# Patient Record
Sex: MALE | Employment: FULL TIME | URBAN - METROPOLITAN AREA
[De-identification: names, ages, dates, MRNs, and addresses within clinical notes are randomized per-mention and may not be internally consistent; named-entity substitution may affect disease eponyms.]

---

## 2020-12-03 ENCOUNTER — OFFICE VISIT (OUTPATIENT)
Dept: INTERNAL MEDICINE | Facility: CLINIC | Age: 45
End: 2020-12-03
Payer: COMMERCIAL

## 2020-12-03 VITALS
SYSTOLIC BLOOD PRESSURE: 125 MMHG | HEART RATE: 85 BPM | DIASTOLIC BLOOD PRESSURE: 87 MMHG | OXYGEN SATURATION: 95 % | WEIGHT: 218 LBS | BODY MASS INDEX: 28.76 KG/M2

## 2020-12-03 DIAGNOSIS — Z00.00 HEALTH CARE MAINTENANCE: ICD-10-CM

## 2020-12-03 DIAGNOSIS — N52.9 ERECTILE DYSFUNCTION, UNSPECIFIED ERECTILE DYSFUNCTION TYPE: Primary | ICD-10-CM

## 2020-12-03 DIAGNOSIS — B35.1 ONYCHOMYCOSIS: ICD-10-CM

## 2020-12-03 PROCEDURE — 90471 IMMUNIZATION ADMIN: CPT | Performed by: STUDENT IN AN ORGANIZED HEALTH CARE EDUCATION/TRAINING PROGRAM

## 2020-12-03 PROCEDURE — 99386 PREV VISIT NEW AGE 40-64: CPT | Mod: 25 | Performed by: STUDENT IN AN ORGANIZED HEALTH CARE EDUCATION/TRAINING PROGRAM

## 2020-12-03 PROCEDURE — 90686 IIV4 VACC NO PRSV 0.5 ML IM: CPT | Performed by: STUDENT IN AN ORGANIZED HEALTH CARE EDUCATION/TRAINING PROGRAM

## 2020-12-03 RX ORDER — KETOCONAZOLE 20 MG/G
CREAM TOPICAL DAILY
Qty: 1 G | Refills: 0 | Status: SHIPPED | OUTPATIENT
Start: 2020-12-03 | End: 2021-03-03

## 2020-12-03 RX ORDER — SILDENAFIL 25 MG/1
12.5 TABLET, FILM COATED ORAL DAILY PRN
Qty: 30 TABLET | Refills: 0 | Status: SHIPPED | OUTPATIENT
Start: 2020-12-03 | End: 2021-06-14

## 2020-12-03 ASSESSMENT — PAIN SCALES - GENERAL: PAINLEVEL: NO PAIN (0)

## 2020-12-03 NOTE — NURSING NOTE
Chief Complaint   Patient presents with     Physical     Pt comes in for annual physical      SANTIAGO Trevino at 11:58 AM sign on 12/3/2020

## 2020-12-03 NOTE — NURSING NOTE
Patient received Flu vaccine. Vaccine was given under the supervision of Dr. Macario. See immunization list for administration details. Pt was advised to remain in [facility] lobby for 15 minutes in case of an averse reaction. Given by Pricila Solis CMA, EMT 1:03 PM 12/3/2020

## 2020-12-03 NOTE — PROGRESS NOTES
"CC: Annual visit     HPI: Jae is a 45Y with a PMH of hemorrhoids presented for his annual visit.    He complains of occasional ED. He does confirm that he does have erections but he is unable to consistently sustain it during intercourse. He attributes it to stress and derangements to his work life balance related to COVID. Negative for signs of depression. Denies fatigue, changes to his sleep or appetite. Tries to lead a healthy lifestyle. Physically active. Does not smoke. He has a family history of early heart disease on his mothers side of the family, notable for \" maker\" infarcts  <50Y.     Exam was notable for onychomycosis on the large toe b/l. Patient remains asymptomatic, it does not bother him. He would like to try local agents for now and will consider getting it pulling in the future.  ROS negative as bellow.    ROS: Review Of Systems  Skin: negative  Eyes: negative  Ears/Nose/Throat: negative  Respiratory: No shortness of breath, dyspnea on exertion, cough, or hemoptysis  Cardiovascular: negative  Gastrointestinal: negative  Genitourinary: negative  Musculoskeletal: negative  Neurologic: negative  Psychiatric: negative  Hematologic/Lymphatic/Immunologic: negative  Endocrine: negative      Past Medical History:   Diagnosis Date     No active medical problems      No past surgical history on file.  Family History   Problem Relation Age of Onset     Family History Negative Mother      Family History Negative Father      Social History     Tobacco Use     Smoking status: Never Smoker     Smokeless tobacco: Never Used   Substance Use Topics     Alcohol use: Yes     Comment: infrequently     Drug use: No     Current Outpatient Medications   Medication Sig Dispense Refill     ketoconazole (NIZORAL) 2 % external cream Apply topically daily for 90 doses 1 g 0     sildenafil (VIAGRA) 25 MG tablet Take 0.5 tablets (12.5 mg) by mouth daily as needed 30 tablet 0      No Known Allergies      /87 (BP " Location: Right arm, Patient Position: Sitting, Cuff Size: Adult Regular)   Pulse 85   Wt 98.9 kg (218 lb)   SpO2 95%   BMI 28.76 kg/m    Physical Examination:    General:  Conversant, generally healthy appearing, no acute distress  Neck:  Trachea midline, Full AROM, supple, thyroid smooth, symmetric, not enlarged, no nodules, no neck lymphadenopathy  Lungs:  Clear to auscultation throughout, no wheezes, rhonchi or rales. Normal respiratory effort and no intercostal retractions.  C/V:  Regular rate and rhythm, no murmurs, rubs or gallops.  No JVD, no carotid bruits. Radial and DP pulses 2+, equal and regular.  Abdomen:  Not distended.  Bowel sounds active.  No tenderness, no hepatosplenomegaly or masses.  No CVA tenderness or masses.  Extremities:  No peripheral edema, no digital cyanosis. Onychomycosis on large toe b/l.   Psych:  Alert and oriented. Appropriate affect.  Not psychomotor slowed.  No signs of anxiety or agitation.      A&P:    Erectile dysfunction  -     sildenafil (VIAGRA) 25 MG tablet; Take 0.5 tablets (12.5 mg) by mouth daily as needed    Health care maintenance  -     Lipid Profile FASTING; Future  -     Hemoglobin A1c; Future  -     Follow up in one year    Onychomycosis  -     ketoconazole (NIZORAL) 2 % external cream; Apply topically daily for 90 doses    Other orders  -     FLU VAC PRESRV FREE QUAD SPLIT VIR 3+YRS IM      Asif Singleton MD  Internal Medicine Resident (PGY1)  [id number]      This patient was discussed and seen with Dr. Macario    Pt was seen and examined with Dr. Singleton.  I agree with his documentation as noted above.    My additional comments: Also counseled on psychological aspects of ED    Charles Macario MD, MD

## 2020-12-03 NOTE — PATIENT INSTRUCTIONS
[facility] Medication Refill Request Information:  * Please contact your pharmacy regarding ANY request for medication refills.  ** [facility] Prescription Fax = [phone]  * Please allow 3 business days for routine medication refills.  * Please allow 5 business days for controlled substance medication refills.     [facility] Test Result notification information:  *You will be notified with in 7-10 days of your appointment day regarding the results of your test.  If you are on MyChart you will be notified as soon as the provider has reviewed the results and signed off on them.    [facility]: [phone]

## 2021-03-15 ENCOUNTER — VIRTUAL VISIT (OUTPATIENT)
Dept: INTERNAL MEDICINE | Facility: CLINIC | Age: 46
End: 2021-03-15
Payer: COMMERCIAL

## 2021-03-15 ENCOUNTER — TELEPHONE (OUTPATIENT)
Dept: INTERNAL MEDICINE | Facility: CLINIC | Age: 46
End: 2021-03-15

## 2021-03-15 DIAGNOSIS — L64.9 ANDROGENIC ALOPECIA, UNSPECIFIED: Primary | ICD-10-CM

## 2021-03-15 PROCEDURE — 99215 OFFICE O/P EST HI 40 MIN: CPT | Mod: GT | Performed by: PEDIATRICS

## 2021-03-15 RX ORDER — FINASTERIDE 1 MG/1
1 TABLET, FILM COATED ORAL DAILY
Qty: 60 TABLET | Refills: 6 | Status: SHIPPED | OUTPATIENT
Start: 2021-03-15 | End: 2022-02-22

## 2021-03-15 NOTE — NURSING NOTE
Chief Complaint   Patient presents with     Recheck Medication     hair loss     Kimberly Nissen, EMT at 10:30 AM on 3/15/2021    Video Visit Technology for this patient: Ky not working, patient has smart device, please try Doximity Video with patient

## 2021-03-15 NOTE — PROGRESS NOTES
Dear patient. I use the medical record to document (to the best of my ability) my understanding of:   - What you told me,  - Relevant details from my exam, records review, and/or test results, and  - My assessment and plan  If you have questions, you are welcome to contact me; I will reply as soon as time allows.      Virtual Visit Details    Type of service:  Video Visit    Start Time: 10:45 AM    End Time:11:34 PM    Originating Location (pt. Location): Home    Distant Location (provider location):  [facility] [place]     Platform used for Visit: Ky    PCP: Asif Singleton  Visit type: problem-oriented  Time note ((n4, 45'): The total time (on the date of service) for this service was 50 minutes, including discussion/face-to-face, chart review, interpretation not otherwise reported, documentation, and updating of the computerized record.        Context    Susannah Rowe is a 46 year old man, with concerns including:  Chief Complaint   Patient presents with     Recheck Medication     hair loss       History, update, and/or problems      Male pattern baldness  SUBJECTIVE: He wanted to discuss thinning hair, hair loss. He sees a lot of ads on TV, wants to hear about his options. No rash or pain.  OBJECTIVE: Hair loss visible over screen appears to be male pattern with receeding anterior hairline.  ASSESSMENT and PLAN: We had a long conversation about pros and cons, including the sexual side effects seen with finasteride, and variation in outcomes with finasteride, minoxidil, and/or various surgery or other approaches.    He elected to proceed with finasteride.    I asked him to get a blood pressure or two (at drug store, for example) and send the numbers to me.          General comments        Review of Systems      Physical exam as possible  Physical Exam  Constitutional:       Appearance: Normal appearance.   HENT:      Head: Normocephalic.      Right Ear: External ear normal.       Left Ear: External ear normal.      Nose: Nose normal.   Eyes:      General: No scleral icterus.        Right eye: No discharge.         Left eye: No discharge.      Extraocular Movements: Extraocular movements intact.   Pulmonary:      Effort: Pulmonary effort is normal. No respiratory distress.      Breath sounds: No stridor. No wheezing.   Skin:     Findings: No rash.      Comments: No apparent abnormalities in exposed skin.  Hair see problem note   Neurological:      Mental Status: He is alert.   Psychiatric:         Attention and Perception: Attention normal.         Mood and Affect: Mood normal.         Speech: Speech normal.         Behavior: Behavior normal.         Thought Content: Thought content normal.         Judgment: Judgment normal.

## 2021-03-15 NOTE — ASSESSMENT & PLAN NOTE
Male pattern baldness  SUBJECTIVE: He wanted to discuss thinning hair, hair loss. He sees a lot of ads on TV, wants to hear about his options. No rash or pain.  OBJECTIVE: Hair loss visible over screen appears to be male pattern with receeding anterior hairline.  ASSESSMENT and PLAN: We had a long conversation about pros and cons, including the sexual side effects seen with finasteride, and variation in outcomes with finasteride, minoxidil, and/or various surgery or other approaches.    He elected to proceed with finasteride.    I asked him to get a blood pressure or two (at drug store, for example) and send the numbers to me.

## 2021-03-16 NOTE — TELEPHONE ENCOUNTER
PRIOR AUTHORIZATION DENIED    Medication: finasteride (PROPECIA) 1 MG tablet    Denial Date: 3/16/2021    Denial Rational:         Appeal Information:

## 2021-03-16 NOTE — TELEPHONE ENCOUNTER
Central Prior Authorization Team   Phone: [phone]      PA Initiation    Medication: finasteride (PROPECIA) 1 MG tablet  Insurance Company: [employer] ([facility]) - Phone [phone] Fax [phone]  Pharmacy Filling the Rx: [id number] [place] [facility] [place] [address] [place] [facility] [address] [facility] [address]  Filling Pharmacy Phone: [phone]  Filling Pharmacy Fax:    Start Date: 3/16/2021

## 2021-06-14 DIAGNOSIS — N52.9 ERECTILE DYSFUNCTION, UNSPECIFIED ERECTILE DYSFUNCTION TYPE: ICD-10-CM

## 2021-06-14 RX ORDER — SILDENAFIL 25 MG/1
12.5 TABLET, FILM COATED ORAL DAILY PRN
Qty: 30 TABLET | Refills: 0 | Status: SHIPPED | OUTPATIENT
Start: 2021-06-14 | End: 2021-12-27

## 2021-06-14 NOTE — CONFIDENTIAL NOTE
3/15/2021  [facility] [place]   River Merlos MD  Internal Medicine    sildenafil (VIAGRA) 25 MG tablet

## 2021-12-27 DIAGNOSIS — N52.9 ERECTILE DYSFUNCTION, UNSPECIFIED ERECTILE DYSFUNCTION TYPE: ICD-10-CM

## 2021-12-27 RX ORDER — SILDENAFIL 25 MG/1
12.5 TABLET, FILM COATED ORAL DAILY PRN
Qty: 30 TABLET | Refills: 0 | Status: SHIPPED | OUTPATIENT
Start: 2021-12-27 | End: 2022-03-24

## 2021-12-27 NOTE — TELEPHONE ENCOUNTER
Last Clinic Visit: 3/15/2021  [facility] [place]  Call to Susannah, home and mobile number incorrect.  Call to work number, advised prescription as been sent to pharmacy

## 2021-12-27 NOTE — TELEPHONE ENCOUNTER
Health Call Center    Phone Message    May a detailed message be left on voicemail: yes     Reason for Call: Medication Refill Request    Has the patient contacted the pharmacy for the refill? Yes   Name of medication being requested: Sildenafil  Provider who prescribed the medication: Dr. Singleton  Pharmacy: [id number] [place] [address] [place] [facility] [address]  Date medication is needed: as soon as possible, per patient,          Action Taken: Message routed to:  [facility] med refill     Travel Screening: Not Applicable

## 2022-01-06 ENCOUNTER — TELEPHONE (OUTPATIENT)
Dept: INTERNAL MEDICINE | Facility: CLINIC | Age: 47
End: 2022-01-06

## 2022-01-06 NOTE — TELEPHONE ENCOUNTER
[facility] Call Center    Phone Message    May a detailed message be left on voicemail: yes     Reason for Call: Other: Patient requesting colon cancer screening. Patient is wondering if that can be done at his annual physical on 02/22/2022.     Please call back to discuss.    Action Taken: Message routed to:  [facility]): PCC    Travel Screening: Not Applicable

## 2022-02-22 ENCOUNTER — VIRTUAL VISIT (OUTPATIENT)
Dept: INTERNAL MEDICINE | Facility: CLINIC | Age: 47
End: 2022-02-22
Payer: COMMERCIAL

## 2022-02-22 DIAGNOSIS — Z13.1 DIABETES MELLITUS SCREENING: Primary | ICD-10-CM

## 2022-02-22 DIAGNOSIS — L64.9 ANDROGENIC ALOPECIA, UNSPECIFIED: ICD-10-CM

## 2022-02-22 DIAGNOSIS — Z13.220 LIPID SCREENING: ICD-10-CM

## 2022-02-22 DIAGNOSIS — Z12.11 COLON CANCER SCREENING: ICD-10-CM

## 2022-02-22 PROCEDURE — 99214 OFFICE O/P EST MOD 30 MIN: CPT | Mod: 95 | Performed by: PEDIATRICS

## 2022-02-22 NOTE — NURSING NOTE
Susannah Rowe is a 47 year old male patient who is being evaluated via a billable video visit. The patient was called and checked in for today's virtual visit. After the patient was checked in, Susannah Rowe's primary concerns for today's visit were discussed. The following are the primary complaints of the patient:     Chief Complaint   Patient presents with     Establish Care     Several health concerns     The patient's allergies and medications were reviewed as noted. The patient does not have any other questions for the provider.    Marcos Leos, EMT at 5:50 PM on 2/22/2022  Primary Care Center  [facility]

## 2022-02-23 NOTE — ASSESSMENT & PLAN NOTE
Hair loss problem. He was prescribed finasteride, so didn't feel it improved anything. He asked for recommendations about hair transplant. We talked about this last year, and how this is usually cosmetic and not covered by insurance. I don't have specific recommendations about a place to go in the area.

## 2022-02-23 NOTE — PROGRESS NOTES
"Dear patient. Thank you for visiting with me. I want you to feel respected, understood, and empowered. \"Respect\" is valuing you as much as I would a close family member. \"Empowerment\" happens when you are fully informed, and can make the best possible decision for you.  Please ask me questions!  Challenge anything that is not clear.    Medical records are primarily used as memory aids for me and my colleagues. Things to know about my documentation style:  - The 'problem list' includes current symptoms or diagnoses, and some problems that are resolved but may return. I use the past medical history for problems not expected to return.  - I use single quotation marks for things that you or I said, when I want to clarify who was speaking.  - I use double quotation marks when copying a term from elsewhere in your records. Italics (besides here) may also denote a quotation.  If you have questions or concerns, please contact me; I will reply as soon as time allows.      Virtual Visit Details    Type of service:  Video Visit, tech problems so switching to audio    Start Time: 7:02 PM    End Time:7:33 PM    Originating Location (pt. Location): Home    Distant Location (provider location):  [facility] [place]     Platform used for Visit: manetch    PCP: River Merlos  Visit type: problem-oriented  Time note (e4, 30'): The total time (on the date of service) for this service was 37 minutes, including discussion/face-to-face, chart review, interpretation not otherwise reported, documentation, and updating of the computerized record.        Context    Susannah Rowe is a 47 year old man, with concerns including:  Chief Complaint   Patient presents with     Establish Care     Several health concerns       History, update, and/or problems    Wants to get physical things out of the way, due to age. Lots of questions addressed.    He has a gym membership, works out once in a while (around " 1/month lately with illness, but usually 3-4 times/week, can shovel snow ok). He travels for work.    Other questions about risk, risk assessment for heart. Will     COVID shot recommended.  Due for tetanus shot (counseled about this)    Colon cancer screening  No FHx. He talked about maybe staying awake.  Details about prep    Counseled and declined HIV and HCV    1. Diabetes mellitus screening  - Glucose; Future    2. Lipid screening  - Lipid Profile FASTING; Future    3. Colon cancer screening  - Adult Gastro Ref - Procedure Only; Future      Hair loss problem. He was prescribed finasteride, so didn't feel it improved anything. He asked for recommendations about hair transplant. We talked about this last year, and how this is usually cosmetic and not covered by insurance. I don't have specific recommendations about a place to go in the area.

## 2022-03-23 ENCOUNTER — LAB (OUTPATIENT)
Dept: LAB | Facility: CLINIC | Age: 47
End: 2022-03-23
Payer: COMMERCIAL

## 2022-03-23 DIAGNOSIS — N52.9 ERECTILE DYSFUNCTION, UNSPECIFIED ERECTILE DYSFUNCTION TYPE: ICD-10-CM

## 2022-03-23 DIAGNOSIS — Z13.1 DIABETES MELLITUS SCREENING: ICD-10-CM

## 2022-03-23 DIAGNOSIS — Z13.220 LIPID SCREENING: ICD-10-CM

## 2022-03-23 LAB
CHOLEST SERPL-MCNC: 170 MG/DL
FASTING STATUS PATIENT QL REPORTED: YES
FASTING STATUS PATIENT QL REPORTED: YES
GLUCOSE BLD-MCNC: 100 MG/DL (ref 70–99)
HDLC SERPL-MCNC: 48 MG/DL
LDLC SERPL CALC-MCNC: 94 MG/DL
NONHDLC SERPL-MCNC: 122 MG/DL
TRIGL SERPL-MCNC: 142 MG/DL

## 2022-03-23 PROCEDURE — 80061 LIPID PANEL: CPT

## 2022-03-23 PROCEDURE — 82947 ASSAY GLUCOSE BLOOD QUANT: CPT

## 2022-03-23 PROCEDURE — 36415 COLL VENOUS BLD VENIPUNCTURE: CPT

## 2022-03-24 ENCOUNTER — MYC MEDICAL ADVICE (OUTPATIENT)
Dept: INTERNAL MEDICINE | Facility: CLINIC | Age: 47
End: 2022-03-24

## 2022-03-24 DIAGNOSIS — Z53.9 ERRONEOUS ENCOUNTER--DISREGARD: Primary | ICD-10-CM

## 2022-03-24 NOTE — CONFIDENTIAL NOTE
Please call patient. Wanted to send the following messages, but the patient doesn't have Fin[employer]yticahart.    MESSAGE #1. Sildenafil is no longer covered by his insurance.     I received your request for a refill of sildenafil, which is fine with me. However, the computer system tells me that your insurance prefers an alternative, tadafil. Therefore, sildenafil will cost you more than tadalfil. The two medicines have basically the same effect.  Please check with your insurance, and let us know what you want to do.      MESSAGE #2, since you are calling.    Blood tests were normal. I recommend another fasting test in 1 year.

## 2022-03-24 NOTE — TELEPHONE ENCOUNTER
[facility] Call Center    Phone Message    May a detailed message be left on voicemail: yes     Reason for Call: Other: Patient called regarding his sildenafil prescription refill request. Patient stated its fine sending through the sildenafil since he will be using a different insurance company. Please send the refill to [facility] in [place].      Action Taken: Message routed to:  [facility]    Travel Screening: Not Applicable

## 2022-03-25 RX ORDER — SILDENAFIL 25 MG/1
12.5 TABLET, FILM COATED ORAL DAILY PRN
Qty: 30 TABLET | Refills: 6 | Status: SHIPPED | OUTPATIENT
Start: 2022-03-25 | End: 2022-09-26

## 2022-03-25 NOTE — TELEPHONE ENCOUNTER
sildenafil (VIAGRA) 25 MG tablet  Last Written Prescription Date:  12/27/2021  Last Fill Quantity: 30,   # refills: 0  Last Office Visit : 2/22/2022  Future Office visit:  None  30 Tabs, 6 Refills sent to pharm 3/25/2022      Eleanor Valle RN  Central Triage Red Flags/Med Refills     /

## 2022-06-19 PROBLEM — R73.01 IMPAIRED FASTING GLUCOSE: Status: ACTIVE | Noted: 2022-06-19

## 2022-06-19 PROBLEM — R73.01 IMPAIRED FASTING GLUCOSE: Status: ACTIVE | Noted: 2022-04-01

## 2022-09-26 ENCOUNTER — TELEPHONE (OUTPATIENT)
Dept: INTERNAL MEDICINE | Facility: CLINIC | Age: 47
End: 2022-09-26

## 2022-09-26 DIAGNOSIS — N52.9 ERECTILE DYSFUNCTION, UNSPECIFIED ERECTILE DYSFUNCTION TYPE: ICD-10-CM

## 2022-09-26 NOTE — TELEPHONE ENCOUNTER
[facility] Call Center    Phone Message    May a detailed message be left on voicemail: yes     Reason for Call: Medication Question or concern regarding medication   Prescription Clarification  Name of Medication:    sildenafil (VIAGRA) 25 MG tablet     Prescribing Provider: Dr. Merlos      What on the order needs clarification? Patient calling requesting to change the above medication dosage to 25MG daily. Patient states its a 12.5 MG dose. Please call to discuss thank you.           Action Taken: Message routed to:  [facility]    Travel Screening: Not Applicable

## 2022-09-27 RX ORDER — SILDENAFIL 25 MG/1
25 TABLET, FILM COATED ORAL DAILY PRN
Qty: 30 TABLET | Refills: 3 | Status: SHIPPED | OUTPATIENT
Start: 2022-09-27 | End: 2023-03-16

## 2022-12-02 ENCOUNTER — OFFICE VISIT (OUTPATIENT)
Dept: INTERNAL MEDICINE | Facility: CLINIC | Age: 47
End: 2022-12-02
Payer: COMMERCIAL

## 2022-12-02 VITALS
SYSTOLIC BLOOD PRESSURE: 116 MMHG | BODY MASS INDEX: 30.71 KG/M2 | WEIGHT: 231.7 LBS | HEIGHT: 73 IN | DIASTOLIC BLOOD PRESSURE: 78 MMHG | OXYGEN SATURATION: 96 % | HEART RATE: 63 BPM

## 2022-12-02 DIAGNOSIS — M54.50 CHRONIC RIGHT-SIDED LOW BACK PAIN WITHOUT SCIATICA: Primary | ICD-10-CM

## 2022-12-02 DIAGNOSIS — G89.29 CHRONIC RIGHT-SIDED LOW BACK PAIN WITHOUT SCIATICA: Primary | ICD-10-CM

## 2022-12-02 PROCEDURE — 99214 OFFICE O/P EST MOD 30 MIN: CPT | Performed by: INTERNAL MEDICINE

## 2022-12-02 RX ORDER — MELOXICAM 7.5 MG/1
15 TABLET ORAL DAILY
Qty: 60 TABLET | Refills: 1 | Status: SHIPPED | OUTPATIENT
Start: 2022-12-02 | End: 2024-01-17

## 2022-12-02 NOTE — NURSING NOTE
"Susannah Rowe is a 47 year old male patient that presents today in clinic for the following:    Chief Complaint   Patient presents with     Pain     Pt reports lower right back and leg pain (where butt, back and leg meet), usually when sitting, sometimes lying down, sharp burning pain     The patient's allergies and medications were reviewed as noted. A set of vitals were recorded as noted without incident: /78 (BP Location: Right arm, Patient Position: Sitting, Cuff Size: Adult Large)   Pulse 63   Ht 1.854 m (6' 0.99\")   Wt 105.1 kg (231 lb 11.2 oz)   SpO2 96%   BMI 30.58 kg/m  . The patient does not have any other questions for the provider.    Michael Hadley, Visit Facilitator 7:13 AM on 12/2/2022   "

## 2022-12-02 NOTE — PROGRESS NOTES
Assessment & Plan   Chronic right-sided low back pain without sciatica  No red flag symptoms.  SI joint pain versus other imbalance.  Will do course of antiinflammatories and referral to PT.  If no improvement, consider imaging at that point  - meloxicam (MOBIC) 7.5 MG tablet  Dispense: 60 tablet; Refill: 1  - Physical Therapy Referral    HM: declined flu shot    RTC: Return if symptoms worsen or fail to improve.  I spent a total of 30 minutes on the day of the visit.  Time was spent doing chart review, history and exam, documentation and further activities as noted above.    Cherelle Mac MD  Securely message with the Vocera Web Console      Chief Complaint   Susannah Rowe is a 47 year old male presents for the following health issues    History of Present Illness   Pain in R low back.    When driving.  When he brakes with the R leg he feels increased pain in low back.    Also notes it when laying in bed at night  Has a desk job.  Occasional pain when sitting    Goes to gym a few times per week  No pain when active and moving around  Will try to stretch at the gym and almost feels like it helps    Pain stays in low back, no radiation  Feels like a burning  No weakness    Has been present for 2-3 months.  Has taken ibuprofen.  Helped a little  Because it's intermittent, it's hard to know if ibuprofen is helping  Typically gets pain at least daily  Has used massage gun.  Seems like pain is too deep to really improve symptoms      Tobacco Use      Smoking status: Never      Smokeless tobacco: Never    PHQ-2 Score:   PHQ-2 ( 1999 Pfizer) 12/2/2022 12/3/2020   Q1: Little interest or pleasure in doing things 0 0   Q2: Feeling down, depressed or hopeless 0 0   PHQ-2 Score 0 0   PHQ-2 Total Score (12-17 Years)- Positive if 3 or more points; Administer PHQ-A if positive - 0       ROS    Physical Exam   /78 (BP Location: Right arm, Patient Position: Sitting, Cuff Size: Adult Large)   Pulse 63   Ht 1.854 m  "(6' 0.99\")   Wt 105.1 kg (231 lb 11.2 oz)   SpO2 96%   BMI 30.58 kg/m    Gen: no distress, comfortable, pleasant   Eyes: anicteric, normal extra-ocular movements   Unable to reproduce pain with palpation.  5/5 strength lower extremities bilaterally.  Negative straight leg raise  Psychological: appropriate mood     Items reviewed:   Allergies  Meds            "

## 2023-01-05 ENCOUNTER — THERAPY VISIT (OUTPATIENT)
Dept: PHYSICAL THERAPY | Facility: CLINIC | Age: 48
End: 2023-01-05
Attending: INTERNAL MEDICINE
Payer: COMMERCIAL

## 2023-01-05 DIAGNOSIS — G89.29 CHRONIC RIGHT-SIDED LOW BACK PAIN WITHOUT SCIATICA: ICD-10-CM

## 2023-01-05 DIAGNOSIS — M54.50 CHRONIC RIGHT-SIDED LOW BACK PAIN WITHOUT SCIATICA: ICD-10-CM

## 2023-01-05 PROCEDURE — 97161 PT EVAL LOW COMPLEX 20 MIN: CPT | Mod: GP | Performed by: PHYSICAL THERAPIST

## 2023-01-05 PROCEDURE — 97110 THERAPEUTIC EXERCISES: CPT | Mod: GP | Performed by: PHYSICAL THERAPIST

## 2023-01-05 NOTE — PROGRESS NOTES
Physical Therapy Initial Evaluation  Subjective:  The history is provided by the patient. No  was used.   Patient Health History  Susannah Rowe being seen for low back pain.     Problem began: 12/2/2022 (md referral date).   Problem occurred: not sure   Pain is reported as 5/10 on pain scale.  General health as reported by patient is good.  Pertinent medical history includes: none.   Red flags:  None as reported by patient.  Medical allergies: none.       Current medications:  None.    Current occupation .   Primary job tasks include:  Computer work.                  Therapist Generated HPI Evaluation  Problem details: Pt referred to therapy diagnosis of chronic right sided low back pain without sciatica. Pain right low back. Worse with lying at night, and has to find certain position to relieve the pain. Sitting, especially with driving, will increase the pain. Any sitting will increase the pain. .         Type of problem:  Lumbar.    This is a chronic condition.  Condition occurred with:  Insidious onset.  Where condition occurred: for unknown reasons.  Patient reports pain:  Lumbar spine right and SI joint right.  Pain is described as aching   Pain radiates to:  No radiation. Pain is the same all the time.  Since onset symptoms are unchanged.  Symptoms are exacerbated by certain positions and sitting  and relieved by activity/movement.      Restrictions due to condition include:  Working in normal job without restrictions.  Barriers include:  None as reported by patient.                        Objective:  System         Lumbar/SI Evaluation  ROM:    AROM Lumbar:   Flexion:          Finger tips to level of ankle, limited by hamstring tightness  Ext:                    Minimal limitation   Side Bend:        Left:  WNL    Right:  WNL  Rotation:           Left:     Right:   Side Glide:        Left:     Right:         Strength: core stabilizers 5-/5; bilateral hip abd and ext 5/5.   Hamstring length bilaterally WNL lacks 10 degrees.   Lumbar Myotomes:    T12-L3 (Hip Flex):  Left: 5    Right: 5  L2-4 (Quads):  Left:  5    Right:  5  L4 (Ankle DF):  Left:  5    Right:  5  L5 (Great Toe Ext): Left: 5    Right: 5   S1 (Toe Raise):  Left: 5    Right: 5        Neural Tension/Mobility:  Lumbar:  Normal        Lumbar Palpation:  normal            SI joint/Sacrum:          Left negative at:    Forward bend standing; Forward bend seated; Squish and Sacral thrust    Right negative at:  Forward bend standing; Forward bend seated; Squish and Sacral thrust                                                   Andres Lumbar Evaluation    Posture:  Sitting: fair  Standing: good  Lordosis: WNL  Lateral Shift: no      Movement Loss:  Flexion (Flex): min  Extension (EXT): min  Side Glide R (SG R): nil  Side Glide L (SG L): nil  Test Movements:  FIS: During: no effect  After: no effect  Pretest Movements: 0/10  Repeat FIS: During: no effect  After: no effect    EIS: During: no effect  After: no effect    Repeat EIS: During: no effect  After: no effect    NEENA: During: no effect  After: no effect    Repeat NEENA: During: no effect  After: no effect    EIL: During: no effect  After: no effect    Repeat EIL: During: no effect  After: no effect                                                   ROS    Assessment/Plan:    Patient is a 47 year old male with lumbar complaints.    Patient has the following significant findings with corresponding treatment plan.                Diagnosis 1:  Chronic right sided low back pain without sciatica  Pain -  hot/cold therapy, manual therapy and home program  Decreased ROM/flexibility - manual therapy, therapeutic exercise and home program  Decreased joint mobility - manual therapy, therapeutic exercise and home program  Decreased strength - therapeutic exercise, therapeutic activities and home program    Therapy Evaluation Codes:   1) Clinical presentation characteristics  are:   Stable/Uncomplicated.  2) Decision-Making    Low complexity using standardized patient assessment instrument and/or measureable assessment of functional outcome.  Cumulative Therapy Evaluation is: Low complexity.    Previous and current functional limitations:  (See Goal Flow Sheet for this information)    Short term and Long term goals: (See Goal Flow Sheet for this information)     Communication ability:  Patient appears to be able to clearly communicate and understand verbal and written communication and follow directions correctly.  Treatment Explanation - The following has been discussed with the patient:   RX ordered/plan of care  Anticipated outcomes  Possible risks and side effects  This patient would benefit from PT intervention to resume normal activities.   Rehab potential is good.    Frequency:  1 X week, once daily  Duration:  for 4 weeks tapering to 2 X a month over 8 weeks  Discharge Plan:  Achieve all LTG.  Independent in home treatment program.  Reach maximal therapeutic benefit.    Please refer to the daily flowsheet for treatment today, total treatment time and time spent performing 1:1 timed codes.

## 2023-03-15 DIAGNOSIS — N52.9 ERECTILE DYSFUNCTION, UNSPECIFIED ERECTILE DYSFUNCTION TYPE: ICD-10-CM

## 2023-03-16 ENCOUNTER — TELEPHONE (OUTPATIENT)
Dept: INTERNAL MEDICINE | Facility: CLINIC | Age: 48
End: 2023-03-16
Payer: COMMERCIAL

## 2023-03-16 DIAGNOSIS — Z12.11 SCREENING FOR COLON CANCER: Primary | ICD-10-CM

## 2023-03-16 RX ORDER — SILDENAFIL 25 MG/1
25 TABLET, FILM COATED ORAL DAILY PRN
Qty: 30 TABLET | Refills: 2 | Status: SHIPPED | OUTPATIENT
Start: 2023-03-16 | End: 2023-06-26

## 2023-03-16 NOTE — TELEPHONE ENCOUNTER
sildenafil (VIAGRA) 25 MG tablet    Office Visit    12/2/2022  [facility] Internal Medicine Cherelle Stratton MD  Internal Medicine

## 2023-03-16 NOTE — TELEPHONE ENCOUNTER
[facility] Call Center    Phone Message    May a detailed message be left on voicemail: yes     Reason for Call: Order(s): Other:   Reason for requested: colonoscopy Referral    The patient is asking for a colonoscopy Referral, please call patient once order has been placed thank you.    Date needed: asap  Provider name: PCP:  River Merlos MD      Action Taken: Message routed to:  [facility] pcc    Travel Screening: Not Applicable

## 2023-03-17 NOTE — TELEPHONE ENCOUNTER
Pt will be contacted to schedule the colonoscopy.        Itz Ramirez CMA ([facility]) at 9:08 AM on 3/17/2023

## 2023-04-07 ENCOUNTER — OFFICE VISIT (OUTPATIENT)
Dept: URGENT CARE | Facility: URGENT CARE | Age: 48
End: 2023-04-07
Payer: COMMERCIAL

## 2023-04-07 VITALS
HEART RATE: 82 BPM | TEMPERATURE: 98.6 F | BODY MASS INDEX: 27.32 KG/M2 | SYSTOLIC BLOOD PRESSURE: 119 MMHG | OXYGEN SATURATION: 97 % | WEIGHT: 207 LBS | DIASTOLIC BLOOD PRESSURE: 82 MMHG

## 2023-04-07 DIAGNOSIS — R53.83 FATIGUE, UNSPECIFIED TYPE: Primary | ICD-10-CM

## 2023-04-07 LAB
ANION GAP SERPL CALCULATED.3IONS-SCNC: <1 MMOL/L (ref 3–14)
BASOPHILS # BLD AUTO: 0.1 10E3/UL (ref 0–0.2)
BASOPHILS NFR BLD AUTO: 1 %
BUN SERPL-MCNC: 11 MG/DL (ref 7–30)
CALCIUM SERPL-MCNC: 9.4 MG/DL (ref 8.5–10.1)
CHLORIDE BLD-SCNC: 103 MMOL/L (ref 94–109)
CO2 SERPL-SCNC: 30 MMOL/L (ref 20–32)
CREAT SERPL-MCNC: 0.9 MG/DL (ref 0.66–1.25)
EOSINOPHIL # BLD AUTO: 0.1 10E3/UL (ref 0–0.7)
EOSINOPHIL NFR BLD AUTO: 2 %
ERYTHROCYTE [DISTWIDTH] IN BLOOD BY AUTOMATED COUNT: 13.9 % (ref 10–15)
ERYTHROCYTE [SEDIMENTATION RATE] IN BLOOD BY WESTERGREN METHOD: 1 MM/HR (ref 0–15)
GFR SERPL CREATININE-BSD FRML MDRD: >90 ML/MIN/1.73M2
GLUCOSE BLD-MCNC: 85 MG/DL (ref 70–99)
HCT VFR BLD AUTO: 50.8 % (ref 40–53)
HGB BLD-MCNC: 16.6 G/DL (ref 13.3–17.7)
LYMPHOCYTES # BLD AUTO: 2.5 10E3/UL (ref 0.8–5.3)
LYMPHOCYTES NFR BLD AUTO: 28 %
MCH RBC QN AUTO: 26.9 PG (ref 26.5–33)
MCHC RBC AUTO-ENTMCNC: 32.7 G/DL (ref 31.5–36.5)
MCV RBC AUTO: 82 FL (ref 78–100)
MONOCYTES # BLD AUTO: 0.9 10E3/UL (ref 0–1.3)
MONOCYTES NFR BLD AUTO: 10 %
MONOCYTES NFR BLD AUTO: NEGATIVE %
NEUTROPHILS # BLD AUTO: 5.5 10E3/UL (ref 1.6–8.3)
NEUTROPHILS NFR BLD AUTO: 61 %
PLATELET # BLD AUTO: 289 10E3/UL (ref 150–450)
POTASSIUM BLD-SCNC: 4 MMOL/L (ref 3.4–5.3)
RBC # BLD AUTO: 6.18 10E6/UL (ref 4.4–5.9)
SODIUM SERPL-SCNC: 132 MMOL/L (ref 133–144)
WBC # BLD AUTO: 9.1 10E3/UL (ref 4–11)

## 2023-04-07 PROCEDURE — 84443 ASSAY THYROID STIM HORMONE: CPT | Performed by: FAMILY MEDICINE

## 2023-04-07 PROCEDURE — 86308 HETEROPHILE ANTIBODY SCREEN: CPT | Performed by: FAMILY MEDICINE

## 2023-04-07 PROCEDURE — 85652 RBC SED RATE AUTOMATED: CPT | Performed by: FAMILY MEDICINE

## 2023-04-07 PROCEDURE — 85025 COMPLETE CBC W/AUTO DIFF WBC: CPT | Performed by: FAMILY MEDICINE

## 2023-04-07 PROCEDURE — 36415 COLL VENOUS BLD VENIPUNCTURE: CPT | Performed by: FAMILY MEDICINE

## 2023-04-07 PROCEDURE — 82306 VITAMIN D 25 HYDROXY: CPT | Performed by: FAMILY MEDICINE

## 2023-04-07 PROCEDURE — 80048 BASIC METABOLIC PNL TOTAL CA: CPT | Performed by: FAMILY MEDICINE

## 2023-04-07 PROCEDURE — U0005 INFEC AGEN DETEC AMPLI PROBE: HCPCS | Performed by: FAMILY MEDICINE

## 2023-04-07 PROCEDURE — 99214 OFFICE O/P EST MOD 30 MIN: CPT | Mod: CS | Performed by: FAMILY MEDICINE

## 2023-04-07 PROCEDURE — U0003 INFECTIOUS AGENT DETECTION BY NUCLEIC ACID (DNA OR RNA); SEVERE ACUTE RESPIRATORY SYNDROME CORONAVIRUS 2 (SARS-COV-2) (CORONAVIRUS DISEASE [COVID-19]), AMPLIFIED PROBE TECHNIQUE, MAKING USE OF HIGH THROUGHPUT TECHNOLOGIES AS DESCRIBED BY CMS-2020-01-R: HCPCS | Performed by: FAMILY MEDICINE

## 2023-04-07 PROCEDURE — 86140 C-REACTIVE PROTEIN: CPT | Performed by: FAMILY MEDICINE

## 2023-04-07 NOTE — PATIENT INSTRUCTIONS
Stay at home until the COVID-19 test result is known.    If you don't hear of the COVID-19 test result in 24 hours, call [phone].      Follow up with a primary care provider for further evaluation of the fatigue.      Get plenty of rest.

## 2023-04-07 NOTE — PROGRESS NOTES
"SUBJECTIVE:   Susannah Rowe is a 48 year old male presenting with a chief complaint of fatigue (every morning upon awakening, lasting about 15 minutes and then recurs at noon, similar to feeling \"drained\" ), feeling cloudy in the head off-and-on for the past four days, slight nausea/butterfly sensation in the abdomen.  No vision problems.  No balance problems.    .  .  Onset of symptoms was three days ago.  Course of illness is still present. .    Current and Associated symptoms: Patient started to feel feverish three nights ago but has not experienced fevers since then.  .  ..  No new medications  No loss of smell/taste  No current fever.   No history of thyroid problems.   No shortness of breath   No chest pain  No cough  No vomiting/diarrhea  No darker than normal lips/toes/fingers.   No history of Vitamin D Deficiency    No sick contacts with COVID-19.      Past Medical History:    No major medical problems.     Current Outpatient Medications   Medication Sig Dispense Refill     meloxicam (MOBIC) 7.5 MG tablet Take 2 tablets (15 mg) by mouth daily Use 15 mg x3 days, then start 7.5mg daily 60 tablet 1     sildenafil (VIAGRA) 25 MG tablet Take 1 tablet (25 mg) by mouth daily as needed (ED) 30 tablet 2     Social History     Tobacco Use     Smoking status: Never     Smokeless tobacco: Never   Vaping Use     Vaping status: Not on file   Substance Use Topics     Alcohol use: Yes     Comment: infrequently       ROS:  CONSTITUTIONAL:positive for fatigue  INTEGUMENTARY/SKIN:  Negative for cyanosis  GI:  Positive for nausea.      OBJECTIVE:  /82   Pulse 82   Temp 98.6  F (37  C) (Tympanic)   Wt 93.9 kg (207 lb)   SpO2 97%   BMI 27.32 kg/m    GENERAL APPEARANCE: healthy, alert and no distress Patient is alert and has no acute respiratory distress.    HENT: mouth is moist. Oropharynx is mildly erythematous without tonsillar exudates    NECK: supple, nontender, no lymphadenopathy.  Normal palpation of the thyroid " gland.    RESP: lungs clear to auscultation - no rales, rhonchi or wheezes  CV: regular rates and rhythm, normal S1 S2, no murmur noted  NEURO: Normal strength and tone, sensory exam grossly normal,  normal speech and mentation  SKIN: no cyanosis.  No pallor.      LABS:    Results for orders placed or performed in visit on 04/07/23   Basic metabolic panel  (Ca, Cl, CO2, Creat, Gluc, K, Na, BUN)     Status: Abnormal   Result Value Ref Range    Sodium 132 (L) 133 - 144 mmol/L    Potassium 4.0 3.4 - 5.3 mmol/L    Chloride 103 94 - 109 mmol/L    Carbon Dioxide (CO2) 30 20 - 32 mmol/L    Anion Gap <1 (L) 3 - 14 mmol/L    Urea Nitrogen 11 7 - 30 mg/dL    Creatinine 0.90 0.66 - 1.25 mg/dL    Calcium 9.4 8.5 - 10.1 mg/dL    Glucose 85 70 - 99 mg/dL    GFR Estimate >90 >60 mL/min/1.73m2   ESR: Erythrocyte sedimentation rate     Status: Normal   Result Value Ref Range    Erythrocyte Sedimentation Rate 1 0 - 15 mm/hr   Mononucleosis screen     Status: Normal   Result Value Ref Range    Mononucleosis Screen Negative Negative   CBC with platelets and differential     Status: Abnormal   Result Value Ref Range    WBC Count 9.1 4.0 - 11.0 10e3/uL    RBC Count 6.18 (H) 4.40 - 5.90 10e6/uL    Hemoglobin 16.6 13.3 - 17.7 g/dL    Hematocrit 50.8 40.0 - 53.0 %    MCV 82 78 - 100 fL    MCH 26.9 26.5 - 33.0 pg    MCHC 32.7 31.5 - 36.5 g/dL    RDW 13.9 10.0 - 15.0 %    Platelet Count 289 150 - 450 10e3/uL    % Neutrophils 61 %    % Lymphocytes 28 %    % Monocytes 10 %    % Eosinophils 2 %    % Basophils 1 %    Absolute Neutrophils 5.5 1.6 - 8.3 10e3/uL    Absolute Lymphocytes 2.5 0.8 - 5.3 10e3/uL    Absolute Monocytes 0.9 0.0 - 1.3 10e3/uL    Absolute Eosinophils 0.1 0.0 - 0.7 10e3/uL    Absolute Basophils 0.1 0.0 - 0.2 10e3/uL   CBC with platelets and differential     Status: Abnormal    Narrative    The following orders were created for panel order CBC with platelets and differential.  Procedure                               Abnormality          Status                     ---------                               -----------         ------                     CBC with platelets and d...[[id number]]  Abnormal            Final result                 Please view results for these tests on the individual orders.         ASSESSMENT:  Fatigue of unknown etiology    Today's Monospot is negative, ruling out infectious mononucleosis.  The ESR is negative, making an inflammatory process a less likely cause of the fatigue.  The basic metabolic panel is mostly within normal limits except for Sodium of 132 .  The complete blood cell count showed no leukocytosis and no anemia.      PLAN:    Pending labs:    COVID-19 PCR Test, CRP, TSH, Vitamin D level      Follow up with a primary care provider for further evaluation of the fatigue.      Get plenty of rest.      Emiliano Robison MD

## 2023-04-07 NOTE — LETTER
April 7, 2023      Susannah Rowe  [address] [place] [address]        To Whom It May Concern:    Susannah Rowe was seen in at the [facility] on April 7, 2023.  Please excuse his absence from work this afternoon in order to be evaluated at our clinic.. He may return to work on April 10, 2023, without special work restrictions.      Sincerely,        Emiliano Robison MD

## 2023-04-08 ENCOUNTER — NURSE TRIAGE (OUTPATIENT)
Dept: NURSING | Facility: CLINIC | Age: 48
End: 2023-04-08
Payer: COMMERCIAL

## 2023-04-08 LAB
CRP SERPL-MCNC: <3 MG/L
TSH SERPL DL<=0.005 MIU/L-ACNC: 0.99 UIU/ML (ref 0.3–4.2)

## 2023-04-09 ENCOUNTER — NURSE TRIAGE (OUTPATIENT)
Dept: NURSING | Facility: CLINIC | Age: 48
End: 2023-04-09
Payer: COMMERCIAL

## 2023-04-09 LAB — SARS-COV-2 RNA RESP QL NAA+PROBE: NEGATIVE

## 2023-04-09 NOTE — TELEPHONE ENCOUNTER
Pt calling to find out the results of tests done 4/7. Informed pt of negative COVID test result and that the rest have not been commented on by the ordering provider and therefore [facility]NA cannot discuss these with him.     Offered to send msg to PCP and pt was ok with waiting to here from ordering provider or to discuss at PCP OV Friday.    Altagracia Pérez, RN, BSN  [facility] Nurse Advisor 1:24 PM 4/9/2023    Reason for Disposition    General information question, no triage required and triager able to answer question    Additional Information    Negative: [1] Caller is not with the adult (patient) AND [2] reporting urgent symptoms    Negative: Lab result questions    Negative: Medication questions    Negative: Caller can't be reached by phone    Negative: Caller has already spoken to PCP or another triager    Negative: RN needs further essential information from caller in order to complete triage    Negative: Requesting regular office appointment    Negative: [1] Caller requesting NON-URGENT health information AND [2] PCP's office is the best resource    Negative: Health Information question, no triage required and triager able to answer question    Protocols used: INFORMATION ONLY CALL - NO TRIAGE-A-

## 2023-04-09 NOTE — TELEPHONE ENCOUNTER
Susannah calls and says that he wants to know his Covid results. RN checked Epic and saw that pt's Covid test is still in process. RN told this to pt. and pt. voiced understanding. COVID 19 Nurse Triage Plan/Patient Instructions    Please be aware that novel coronavirus (COVID-19) may be circulating in the community. If you develop symptoms such as fever, cough, or SOB or if you have concerns about the presence of another infection including coronavirus (COVID-19), please contact your health care provider or visit [email or web address].     Disposition/Instructions    Home care recommended. Follow home care protocol based instructions.    Thank you for taking steps to prevent the spread of this virus.  o Limit your contact with others.  o Wear a simple mask to cover your cough.  o Wash your hands well and often.    Resources    [facility]: About COVID-19: [email or web address]    CDC: What to Do If You're Sick: www.cdc.gov/coronavirus/2019-ncov/about/steps-when-sick.html    CDC: Ending Home Isolation: www.cdc.gov/coronavirus/2019-ncov/hcp/disposition-in-home-patients.html     CDC: Caring for Someone: www.cdc.gov/coronavirus/2019-ncov/if-you-are-sick/care-for-someone.html     [facility]: Interim Guidance for Hospital Discharge to Home: www.health.[facility].[place]/diseases/coronavirus/hcp/hospdischarge.pdf    [facility] clinical trials (COVID-19 research studies): [email or web address].[email or web address]-clinical-trials     Below are the COVID-19 hotlines at the [facility] of Health ([facility]). Interpreters are available.   o For health questions: Call 516-966-9333 or 1-442.397.1390 (7 a.m. to 7 p.m.)  o For questions about schools and childcare: Call 911-971-4718 or 1-383.619.5987 (7 a.m. to 7 p.m.)                     Reason for Disposition    [1] Follow-up call to recent contact AND [2] information only call, no triage required    Additional Information    Negative: RN needs further essential  information from caller in order to complete triage    Negative: [1] Caller is not with the adult (patient) AND [2] reporting urgent symptoms    Negative: Lab result questions    Negative: Medication questions    Negative: Caller can't be reached by phone    Negative: Caller has already spoken to PCP or another triager    Negative: Requesting regular office appointment    Negative: [1] Caller requesting NON-URGENT health information AND [2] PCP's office is the best resource    Negative: Health Information question, no triage required and triager able to answer question    Negative: General information question, no triage required and triager able to answer question    Negative: Question about upcoming scheduled test, no triage required and triager able to answer question    Negative: [1] Caller is not with the adult (patient) AND [2] probable NON-URGENT symptoms    Protocols used: INFORMATION ONLY CALL - NO TRIAGE-[facility]

## 2023-04-10 LAB — DEPRECATED CALCIDIOL+CALCIFEROL SERPL-MC: 16 UG/L (ref 20–75)

## 2023-04-17 ENCOUNTER — ANCILLARY PROCEDURE (OUTPATIENT)
Dept: GENERAL RADIOLOGY | Facility: CLINIC | Age: 48
End: 2023-04-17
Attending: PEDIATRICS
Payer: COMMERCIAL

## 2023-04-17 ENCOUNTER — OFFICE VISIT (OUTPATIENT)
Dept: INTERNAL MEDICINE | Facility: CLINIC | Age: 48
End: 2023-04-17
Payer: COMMERCIAL

## 2023-04-17 VITALS
OXYGEN SATURATION: 97 % | BODY MASS INDEX: 27.32 KG/M2 | SYSTOLIC BLOOD PRESSURE: 126 MMHG | DIASTOLIC BLOOD PRESSURE: 87 MMHG | WEIGHT: 207 LBS | HEART RATE: 80 BPM

## 2023-04-17 DIAGNOSIS — M54.50 CHRONIC RIGHT-SIDED LOW BACK PAIN WITHOUT SCIATICA: ICD-10-CM

## 2023-04-17 DIAGNOSIS — G89.29 CHRONIC RIGHT-SIDED LOW BACK PAIN WITHOUT SCIATICA: ICD-10-CM

## 2023-04-17 DIAGNOSIS — G89.29 CHRONIC RIGHT-SIDED LOW BACK PAIN WITHOUT SCIATICA: Primary | ICD-10-CM

## 2023-04-17 DIAGNOSIS — Z12.11 COLON CANCER SCREENING: ICD-10-CM

## 2023-04-17 DIAGNOSIS — M54.50 CHRONIC RIGHT-SIDED LOW BACK PAIN WITHOUT SCIATICA: Primary | ICD-10-CM

## 2023-04-17 PROCEDURE — 72110 X-RAY EXAM L-2 SPINE 4/>VWS: CPT | Performed by: STUDENT IN AN ORGANIZED HEALTH CARE EDUCATION/TRAINING PROGRAM

## 2023-04-17 PROCEDURE — 72202 X-RAY EXAM SI JOINTS 3/> VWS: CPT | Performed by: RADIOLOGY

## 2023-04-17 PROCEDURE — 99214 OFFICE O/P EST MOD 30 MIN: CPT | Performed by: PEDIATRICS

## 2023-04-17 ASSESSMENT — ENCOUNTER SYMPTOMS
FEVER: 0
POLYDIPSIA: 0
WEIGHT LOSS: 0
DECREASED APPETITE: 0
WEIGHT GAIN: 0
FATIGUE: 0
ALTERED TEMPERATURE REGULATION: 0
INCREASED ENERGY: 0
HALLUCINATIONS: 0
NIGHT SWEATS: 0

## 2023-04-17 NOTE — PROGRESS NOTES
"Dear patient. Thank you for visiting with me. I want you to feel respected, understood, and empowered. \"Respect\" is valuing you as much as I would a close family member. \"Empowerment\" happens when you are fully informed, and can make the best possible decision for you.  Please ask me questions!  Challenge anything that is not clear.    Medical records are primarily used as memory aids for me and my colleagues. Things to know about my documentation style:  - The 'problem list' includes current symptoms or diagnoses, and some problems that are resolved but may return. I use the past medical history for problems not expected to return.  - I use single quotation marks for things that you or I said, when I want to clarify who was speaking.  - I use double quotation marks when copying a term from elsewhere in your records. Italics (besides here) may also denote a quotation.  If you have questions or concerns, please contact me; I will reply as soon as time allows.    Context    Susannah Rowe is a 48 year old male, with concerns including:  Chief Complaint   Patient presents with     Physical     Pt here for annual physical     PCP: River Merlos   Visit type: problem-oriented    /87 (BP Location: Right arm, Patient Position: Sitting, Cuff Size: Adult Large)   Pulse 80   Wt 93.9 kg (207 lb)   SpO2 97%   BMI 27.32 kg/m      Problems and progress      Problem List as of 4/17/2023 Reviewed: 4/17/2023  4:08 PM by River Merlos MD          Noted    1. Chronic right-sided low back pain without sciatica - Primary 1/5/2023     Last Assessment & Plan 4/17/2023 Office Visit Written 4/17/2023  2:05 PM by QIANA SPANN           SUBJECTIVE:   - Patient reports right sided low back pain, exacerbated with sitting or laying on soft surfaced  - Went to PT in on 1/5/23 and they believe his pain is due to right SI joint dysfunction  - Denies obvious aggravating activity as the cause of his pain. Denies family " history of ankylosing spondylitis or rheumatoid arthritis  - Some relief with Meloxicam (7.5 mg, 2 tab BID)       OBJECTIVE:   EXAM:  - No right SI pain on hard pressure  - Occasional crepitus, not replicable on exam  - Examined in 3 positions but wasn't able to illicit pain in the mid position of the iliac        ASSESSMENT:   - Right sided low back pain       PLAN:   - Lumbar xrays   - Lumbar MRI              Relevant Orders     XR Sacroiliac Joint G/E 3 Views     MR Lumbar Spine w/o Contrast     XR Lumbar Spine G/E 4 Views    2. Colon cancer screening 2/22/2022     Last Assessment & Plan 4/17/2023 Office Visit Written 4/17/2023  2:06 PM by QIANA SPANN      Recommended colonoscopy at last visit. Patient has yet to schedule this but is planning to complete this by 2024.           Review of Systems   Constitutional: Negative for fatigue and fever.   Endocrine: Negative for polydipsia.   Psychiatric/Behavioral: Negative for hallucinations.       Answers for HPI/ROS submitted by the patient on 4/17/2023  General Symptoms: Yes  Skin Symptoms: No  HENT Symptoms: No  EYE SYMPTOMS: No  HEART SYMPTOMS: No  LUNG SYMPTOMS: No  INTESTINAL SYMPTOMS: No  URINARY SYMPTOMS: No  REPRODUCTIVE SYMPTOMS: No  SKELETAL SYMPTOMS: Yes  BLOOD SYMPTOMS: No  NERVOUS SYSTEM SYMPTOMS: No  MENTAL HEALTH SYMPTOMS: No  Fever: No  Loss of appetite: No  Weight loss: No  Weight gain: No  Fatigue: No  Night sweats: No  Excessive thirst: No  Feeling hot or cold when others believe the temperature is normal: No  Loss of height: No  Post-operative complications: No  Surgical site pain: No  Hallucinations: No  Change in or Loss of Energy: No  Hyperactivity: No  Confusion: No    Scribe Disclosure:   I, QIANA SPANN, am serving as a scribe; to document services personally performed by Dr. River Merlos- -based on data collection and the provider's statements to me.     Provider Disclosure:  I agree with above History, Review of Systems, Physical  exam and Plan.  I have reviewed the content of the documentation and have edited it as needed. I have personally performed the services documented here and the documentation accurately represents those services and the decisions I have made.      Electronically signed by:  Dr. River Merlos            About this visit:  Time note (e4, 30'): The total time (on the date of service) for this service was 35 minutes, including discussion/face-to-face, chart review, interpretation not otherwise reported, documentation, and updating of the computerized record.

## 2023-04-17 NOTE — ASSESSMENT & PLAN NOTE
Recommended colonoscopy at last visit. Patient has yet to schedule this but is planning to complete this by 2024.

## 2023-04-17 NOTE — ASSESSMENT & PLAN NOTE
SUBJECTIVE:   - Patient reports right sided low back pain, exacerbated with sitting or laying on soft surfaced  - Went to PT in on 1/5/23 and they believe his pain is due to right SI joint dysfunction  - Denies obvious aggravating activity as the cause of his pain. Denies family history of ankylosing spondylitis or rheumatoid arthritis  - Some relief with Meloxicam (7.5 mg, 2 tab BID)       OBJECTIVE:   EXAM:  - No right SI pain on hard pressure  - Occasional crepitus, not replicable on exam  - Examined in 3 positions but wasn't able to illicit pain in the mid position of the iliac        ASSESSMENT:   - Right sided low back pain       PLAN:   - Lumbar xrays   - Lumbar MRI

## 2023-04-17 NOTE — NURSING NOTE
Susannah Rowe is a 48 year old male that presents in clinic today for the following:     Chief Complaint   Patient presents with     Physical     Pt here for annual physical       The patient's allergies and medications were reviewed. The patient's vitals were obtained without incident. The patient does not have any other questions for the provider.     Ishan Angeles, EMT at 1:29 PM on 4/17/2023.  Primary Care Clinic: [phone]

## 2023-04-18 ENCOUNTER — HOSPITAL ENCOUNTER (OUTPATIENT)
Dept: MRI IMAGING | Facility: CLINIC | Age: 48
Discharge: HOME | End: 2023-04-18
Attending: PEDIATRICS | Admitting: PEDIATRICS
Payer: COMMERCIAL

## 2023-04-18 DIAGNOSIS — M54.50 CHRONIC RIGHT-SIDED LOW BACK PAIN WITHOUT SCIATICA: ICD-10-CM

## 2023-04-18 DIAGNOSIS — G89.29 CHRONIC RIGHT-SIDED LOW BACK PAIN WITHOUT SCIATICA: ICD-10-CM

## 2023-04-18 PROCEDURE — 72148 MRI LUMBAR SPINE W/O DYE: CPT

## 2023-04-20 ENCOUNTER — TELEPHONE (OUTPATIENT)
Dept: INTERNAL MEDICINE | Facility: CLINIC | Age: 48
End: 2023-04-20
Payer: COMMERCIAL

## 2023-04-20 DIAGNOSIS — M47.26 OSTEOARTHRITIS OF SPINE WITH RADICULOPATHY, LUMBAR REGION: Primary | ICD-10-CM

## 2023-04-20 NOTE — TELEPHONE ENCOUNTER
Please call patient (myC not active)  XRs and MRI were helpful. He has arthritis and degenerative disc disease. His worsened symptoms are likely from the protruding disc, although likely has symptoms from other LS issues also.  Recommend:  1. PT (new referral placed, just in case)  2. Spine clinic, to see if additional treatments may help to speed recovery.  3. I highly recommend swimming to improve back health.        How often?  Try for 20-30 minutes of gentle laps, 2-5 times per week.   Time in the hot tub afterwards is a bonus!       How good a swimmer?  Doesn't matter. If you can stay alive in the water, you can swim. Use a snorkel if you have any concern about swimming form. Definitely use a snorkel if there are any neck concerns.       How hard?  Don't worry about cardiovascular benefit. Back health just requires that you be flat in the water and move forward. Your speed can be nice and slow.       Where can you swim?  Look for locations of the [facility], unless you prefer a local gym that happens to have a pool. If there are financial challenges, the [facility] has many programs for reduced costs.

## 2023-04-26 ENCOUNTER — HOSPITAL ENCOUNTER (OUTPATIENT)
Dept: PHYSICAL THERAPY | Facility: REHABILITATION | Age: 48
Discharge: HOME | End: 2023-04-26
Attending: PEDIATRICS
Payer: COMMERCIAL

## 2023-04-26 DIAGNOSIS — G89.29 CHRONIC BILATERAL LOW BACK PAIN WITH RIGHT-SIDED SCIATICA: Primary | ICD-10-CM

## 2023-04-26 DIAGNOSIS — M47.26 OSTEOARTHRITIS OF SPINE WITH RADICULOPATHY, LUMBAR REGION: ICD-10-CM

## 2023-04-26 DIAGNOSIS — M62.81 MUSCLE WEAKNESS (GENERALIZED): ICD-10-CM

## 2023-04-26 DIAGNOSIS — M54.41 CHRONIC BILATERAL LOW BACK PAIN WITH RIGHT-SIDED SCIATICA: Primary | ICD-10-CM

## 2023-04-26 PROCEDURE — 97161 PT EVAL LOW COMPLEX 20 MIN: CPT | Mod: GP | Performed by: PHYSICAL THERAPIST

## 2023-04-26 PROCEDURE — 97110 THERAPEUTIC EXERCISES: CPT | Mod: GP | Performed by: PHYSICAL THERAPIST

## 2023-04-26 NOTE — PROGRESS NOTES
[facility] Initial PT Evaluation        04/26/23 0700   General Information   Type of Visit Initial OP Ortho PT Evaluation   Start of Care Date 04/26/23   Referring Physician Dr. Merlos   Patient/Family Goals Statement pain remove   Orders Evaluate and Treat   Date of Order 04/20/23   Certification Required? No   Medical Diagnosis Osteoarthritis of spine with radiculopathy, lumbar region   Body Part(s)   Body Part(s) Lumbar Spine/SI   Presentation and Etiology   Pertinent history of current problem (include personal factors and/or comorbidities that impact the POC) Pt reports that he has been having low back pain since 9/2022 with insidious onset.  He did do one session of PT and was given a HEP that he did for 6 weeks and it did not seem to relieve his pain.  SInce his initial onset of pain, he sx have improve but he has been doing some massage therapy and that has been helpful.  He notes that if he sits or lays in a position with his spine on slack he has increased pain.  He can be in a certain position and make his back pain and although pit hurts, it does alleviate his back.  He can do most tasks with not much pain but he can also stretch or adjust and then he has less pain completing the activity.  He has done some heat. ice. etc.   No numbness or tingling.  He had an MRI and does not yet know the results.   Onset date of current episode/exacerbation 09/01/23   Prior Level of Function   Prior Level of Function-Mobility WNL   Prior Level of Function-ADLs WNL   Fall Risk Screen   Fall screen completed by PT   Have you fallen 2 or more times in the past year? No   Have you fallen and had an injury in the past year? No   Is patient a fall risk? No   Abuse Screen (yes response referral indicated)   Feels Unsafe at Home or Work/School no   Feels Threatened by Someone no   Does Anyone Try to Keep You From Having Contact with Others or Doing Things Outside Your Home? no   Physical Signs of Abuse Present no    Patient needs abuse support services and resources No   Lumbar Spine/SI Objective Findings   Gait/Locomotion WNL   Hamstring Flexibility min dec   Hip Flexor Flexibility min dec   Quadricep Flexibility min derc   Piriformis Flexibility dec   Flexion ROM no loss, no pain   Extension ROM min loss, can feel the area   Right Side Bending ROM no loss, no pain   Left Side Bending ROM no loss, no pain   Repeated Extension-Standing ROM SB + ext B = no pain   Lumbar ROM Comment R and L Rot = no loss, no pain   Pelvic Screen R LE shorter - upslip with some R pelvis shifted fwd   Hip Flexion (L2) Strength 5   Hip Abduction Strength 4   Hip Adduction Strength 5   Hip Extension Strength 4   Knee Flexion Strength 5   Knee Extension (L3) Strength 5   Ankle Dorsiflexion (L4) Strength 5   Great Toe Extension (L5) Strength 5   Ankle Plantar Flexion (S1) Strength 5   Lumbar/Hip/Knee/Foot Strength Comments hip IR/ER = 5/5   SLR -   Crossover SLR -   Spring Test -   Segmental Mobility minimal decrease in lumbar PA, no particular pain   Palpation increased tenderness over the R gluteals, piriformis, min R lumbar   Slump Test -   Posture fair   Planned Therapy Interventions   Planned Therapy Interventions joint mobilization;manual therapy;neuromuscular re-education;ROM;strengthening;stretching   Planned Modality Interventions   Planned Modality Interventions Cryotherapy;Electrical stimulation;Hot packs;TENS   Clinical Impression   Criteria for Skilled Therapeutic Interventions Met yes, treatment indicated   PT Diagnosis R>L low back and gluteal pain and weakness   Influenced by the following impairments weakness, tenderness, posture, etc   Functional limitations due to impairments ADL's, sitting, laying,   Clinical Presentation Stable/Uncomplicated   Clinical Decision Making (Complexity) Low complexity   Therapy Frequency 1 time/week   Predicted Duration of Therapy Intervention (days/wks) 1x/week for 20 weeks   Risk & Benefits of  therapy have been explained Yes   Patient, Family & other staff in agreement with plan of care Yes   Clinical Impression Comments Assessment:   Pt presents to skilled PT with R>L low back pain that started in September 2022 with no particular mechanism.  He has good lumbar ROM with really no pain.  He has decreased R>L hip strength as well as decreased core and lumbar strength with decreased awareness. Pt with increased tightness and tenderness over the R lumbar and gluteal muscles including the piriformis area.  Mild R pelvic upslip.  He will benefit from skilled PT to address the impairments identified during evaluation.   Ortho Goal 1   Goal Identifier 1   Goal Description Pt will demonstrate readiness for independent sx management and HEP   Target Date 06/10/23   Ortho Goal 2   Goal Identifier 2   Goal Description Pt will be able to perform ADL's and house tasks with increased ease and pain </=4/10   Target Date 06/10/23   Ortho Goal 3   Goal Identifier 3   Goal Description Pt will be able to sit for meals, conversation etc with pain </=4/10   Target Date 06/10/23   Ortho Goal 4   Goal Identifier 4   Goal Description Pt will demonstrate increased function and decreased pain with an BOB score </=15% for improved quality of life   Total Evaluation Time   PT Eval, Low Complexity Minutes (40843) 30     Anum Tarango, PT

## 2023-04-26 NOTE — PROGRESS NOTES
"Assessment:   Pt presents to skilled PT with R>L low back pain that started in September 2022 with no particular mechanism.  He has good lumbar ROM with really no pain.  He has decreased R>L hip strength as well as decreased core and lumbar strength with decreased awareness. Pt with increased tightness and tenderness over the R lumbar and gluteal muscles including the piriformis area.  Mild R pelvic upslip.  He will benefit from skilled PT to address the impairments identified during evaluation.     Exercises:  Date 4/26/23   Exercise    Seated H/S stretch + nerve glide/floss  Bx30\" + Bx10   Seated piriformis stretch  Bx30\"   Abdominal set  x5 with 5\" hold                                          Anum Tarango, PT  "

## 2023-04-27 NOTE — TELEPHONE ENCOUNTER
Patient calling regarding his x-ray and MRI results. States he has not received any phone calls on them and is concerned. Please contact him back.

## 2023-04-27 NOTE — TELEPHONE ENCOUNTER
Called patient and lvm- he needs to schedule with spine clinic. I can speak about results with him.    Juan Richmond RN on 4/27/2023 at 2:42 PM

## 2023-05-03 ENCOUNTER — HOSPITAL ENCOUNTER (OUTPATIENT)
Facility: AMBULATORY SURGERY CENTER | Age: 48
End: 2023-05-03
Attending: SURGERY | Admitting: SURGERY
Payer: COMMERCIAL

## 2023-05-03 ENCOUNTER — TELEPHONE (OUTPATIENT)
Dept: GASTROENTEROLOGY | Facility: CLINIC | Age: 48
End: 2023-05-03
Payer: COMMERCIAL

## 2023-05-03 NOTE — TELEPHONE ENCOUNTER
Screening Questions  BLUE  KIND OF PREP RED  LOCATION [review exclusion criteria] GREEN  SEDATION TYPE        N Are you active on mychart?       JOHN FIELDS Ordering/Referring Provider?        BCBS What type of coverage do you have?      N Have you had a positive covid test in the last 14 days?     27.7 1. BMI  [BMI 40+ - review exclusion criteria& smart-phrase document]    Y  2. Are you able to give consent for your medical care? [IF NO,RN REVIEW]          N  3. Are you taking any prescription pain medications on a routine schedule   (ex narcotics: oxycodone, roxicodone, oxycontin,  and percocet)? [RN Review]          3a. EXTENDED PREP What kind of prescription?     N 4. Do you have any chemical dependencies such as alcohol, street drugs, or methadone?        **If yes 3- 5 , please schedule with MAC sedation.**          IF YES TO ANY 6 - 10 - HOSPITAL SETTING ONLY.     N 6.   Do you need assistance transferring?     N 7.   Have you had a heart or lung transplant?    N 8.   Are you currently on dialysis?   N 9.   Do you use daily home oxygen?   N 10. Do you take nitroglycerin?   10a.  If yes, how often?     11. [FEMALES]   Are you currently pregnant?    11a.  If yes, how many weeks? [ Greater than 12 weeks, OR NEEDED]    N 12. Do you have Pulmonary Hypertension? *NEED PAC APPT AT [facility] w/ [facility]*     N 13. [review exclusion criteria]  Do you have any implantable devices in your body (pacemaker, defib, LVAD)?    N 14. In the past 6 months, have you had any heart related issues including cardiomyopathy or heart attack?     14a.  If yes, did it require cardiac stenting if so when?     N 15. Have you had a stroke or Transient ischemic attack (TIA - aka  mini stroke ) within 6 months?      N 16. Do you have mod to severe Obstructive Sleep Apnea?  [Hospital only]    N 17. Do you have SEVERE AND UNCONTROLLED asthma? *NEED PAC APPT AT [facility] w/MAC*     18. Are you currently taking any blood thinners?     18a. No.  "Continue to 19.   18b. Yes/no Blood Thinner: No. Inform patient to \"follow up w/ ordering provider for bridging instructions.\"    N 19. Do you take the medication Phentermine?    19a. If yes, \"Hold for 7 days before procedure.  Please consult your prescribing provider if you have questions about holding this medication.\"     N  20. Do you have chronic kidney disease?      N  21. Do you have a diagnosis of diabetes?     N  22. On a regular basis do you go 3-5 days between bowel movements?      23. Preferred LOCAL Pharmacy for Pre Prescription    [ LIST ONLY ONE PHARMACY]        [id number] - [place], [place] - [address] [facility] [address] -    Informed patient they will need an adult    Cannot take any type of public or medical transportation alone    Conscious Sedation- Needs  for 6 hours after the procedure       MAC/General-Needs  for 24 hours after procedure    Pre-Procedure [facility] test [facility] be completed [facility]]    Confirmed Nurse will call to complete assessment       - SCHEDULING DETAILS -  NO Hospital Setting Required? If yes, what is the exclusion?:    MANOJ  Surgeon    6/23/2023  Date of Procedure  Lower Endoscopy [Colonoscopy]  Type of Procedure Scheduled  [facility] [place] Location   MIRALAX GATORADE WITHOUT MAGNEISUM CITRATE Which Colonoscopy Prep was Sent?     MODERATE Sedation Type     NO PAC / Pre-op Required                 "

## 2023-05-23 NOTE — ADDENDUM NOTE
Encounter addended by: Anum Tarango, PT on: 5/23/2023 1:00 PM   Actions taken: Clinical Note Signed, Flowsheet accepted, Episode resolved

## 2023-05-23 NOTE — PROGRESS NOTES
DISCHARGE    Reason for Discharge: Patient has not made expected progress due to interrupted treatment attendance.  Patient has failed to schedule further appointments.    Equipment Issued: None    Discharge Plan: Patient to continue home program.    Referring Provider:  River Merlos       04/26/23 0700   Appointment Info   Signing clinician's name / credentials Anum Tarango PT   Visits Used 1   Subjective Report   Subjective Report See eval   Objective Measures   Objective Measures Objective Measure 1   Objective Measure 1   Objective Measure BOB   Details eval=20%   Therapeutic Procedure/Exercise   Therapeutic Procedures: strength, endurance, ROM, flexibillity minutes (19959) 15   Skilled Intervention establish HEP, education   Eval/Assessments   PT Eval, Low Complexity Minutes (10005) 30   Education   Learner/Method Patient   Plan   Homework HEP   Home program See PTRX   Plan for next session Continue with core and lumabr strength and mobility; ADD MT   Comments   Comments Assessment:   Pt presents to skilled PT with R>L low back pain that started in September 2022 with no particular mechanism.  He has good lumbar ROM with really no pain.  He has decreased R>L hip strength as well as decreased core and lumbar strength with decreased awareness. Pt with increased tightness and tenderness over the R lumbar and gluteal muscles including the piriformis area.  Mild R pelvic upslip.  He will benefit from skilled PT to address the impairments identified during evaluation.   Medicare Claim Information   Medical Diagnosis Osteoarthritis of spine with radiculopathy, lumbar region   PT Diagnosis R>L low back and gluteal pain and weakness   Start of Care Date 04/26/23   Onset date of current episode/exacerbation 09/01/23   Session Number   Authorization status 50/calendar year       Anum Tarango PT

## 2023-05-31 ENCOUNTER — TELEPHONE (OUTPATIENT)
Dept: GASTROENTEROLOGY | Facility: CLINIC | Age: 48
End: 2023-05-31
Payer: COMMERCIAL

## 2023-05-31 NOTE — TELEPHONE ENCOUNTER
Caller: Writer to patient  Reason for Reschedule/Cancellation (please be detailed, any staff messages or encounters to note?): Goffredo out      Prior to reschedule please review:    Ordering Provider:River Merlos MD    Sedation per order: Moderate    Does patient have any ASC Exclusions, please identify?: No      Notes on Cancelled Procedure:    Procedure:Lower Endoscopy [Colonoscopy]     Date: 6/23/2023    Location:[facility]; [address] [place] [address], [address] Floor, [place] [address]    Surgeon: Liliya        Rescheduled: No , LVM to call back to reschedule.     CASE IN DEPOT.

## 2023-06-12 ENCOUNTER — HOSPITAL ENCOUNTER (OUTPATIENT)
Facility: CLINIC | Age: 48
End: 2023-06-12
Attending: INTERNAL MEDICINE | Admitting: INTERNAL MEDICINE
Payer: COMMERCIAL

## 2023-06-12 NOTE — TELEPHONE ENCOUNTER
Caller:Susannah Rowe  Rescheduled: Yes    Procedure: Lower Endoscopy [Colonoscopy]     Date: 06/26/2023    Location:[facility]; [facility] [address] [place] [address]    Surgeon: LEILA    Sedation Level Scheduled  MODERATE,  Reason for Sedation Level PER ORDER    Prep/Instructions updated and sent: SENT LETTER

## 2023-06-23 DIAGNOSIS — N52.9 ERECTILE DYSFUNCTION, UNSPECIFIED ERECTILE DYSFUNCTION TYPE: Primary | ICD-10-CM

## 2023-06-26 ENCOUNTER — HOSPITAL ENCOUNTER (OUTPATIENT)
Facility: AMBULATORY SURGERY CENTER | Age: 48
End: 2023-06-26
Attending: SURGERY

## 2023-06-26 ENCOUNTER — TELEPHONE (OUTPATIENT)
Dept: GASTROENTEROLOGY | Facility: CLINIC | Age: 48
End: 2023-06-26
Payer: COMMERCIAL

## 2023-06-26 RX ORDER — SILDENAFIL 25 MG/1
25 TABLET, FILM COATED ORAL DAILY PRN
Qty: 30 TABLET | Refills: 3 | Status: SHIPPED | OUTPATIENT
Start: 2023-06-26 | End: 2023-11-16

## 2023-06-26 NOTE — TELEPHONE ENCOUNTER
Caller: Susannah Rowe  Reason for Reschedule/Cancellation (please be detailed, any staff messages or encounters to note?): states he didn't get his prep info and or phone call(he just didn't answer when nurses called him)      Prior to reschedule please review:    Ordering Provider: Gaurang    Sedation per order: moderate    Does patient have any ASC Exclusions, please identify?: n      Notes on Cancelled Procedure:    Procedure: Lower Endoscopy [Colonoscopy]     Date: 6/26    Location: [facility]; [facility] [place] [address]    Surgeon: Yamila      Rescheduled: Yes    Procedure: Lower Endoscopy [Colonoscopy]     Date: 8/30    Location: [facility]; [address], 5th Floor, [place] [address]    Surgeon: Epi    Sedation Level Scheduled  moderate,  Reason for Sedation Level on block    Prep/Instructions updated and sent: y     Send In - basket message to Panc - Favian Pool if EUS  procedure is canceled or rescheduled: [ N/A, YES or NO] n/a

## 2023-08-15 ENCOUNTER — TELEPHONE (OUTPATIENT)
Dept: GASTROENTEROLOGY | Facility: CLINIC | Age: 48
End: 2023-08-15

## 2023-08-15 NOTE — TELEPHONE ENCOUNTER
Attempted to contact patient in order to complete pre assessment questions.     No answer. Left message to return call to [phone] option 4      Procedure details:    Patient scheduled for Colonoscopy  on 8/30/23.     Arrival time: 0830. Procedure time 0930    Pre op exam needed? N/A    [facility]; [address] [place] [address], [address] Floor, [place] [address]    Sedation type: Conscious sedation     Indication for procedure: screening colonscopy    Chart review:     Electronic implanted devices? No    Diabetic? No      Medication review:    Anticoagulants? No    NSAIDS? Yes.  Meloxicam (Mobic).  Holding interval of 10 days.    Other medication HOLDING recommendations:  N/A      Prep for procedure:     Bowel prep recommendation: Miralax prep without magnesium citrate   Due to: standard bowel prep.    Prep instructions sent via letter, letter was sent by scheduling staff      Shira Hong RN  Endoscopy Procedure Pre Assessment RN

## 2023-08-23 NOTE — TELEPHONE ENCOUNTER
Second call attempt to complete pre assessment.     No answer.  Left message to return call to [phone] #4 within 24 hours or risk procedure being cancelled.     Left messages on both listed numbers.    Additional information needed?  N/A      Shira Hong RN  Endoscopy Procedure Pre Assessment RN

## 2023-08-24 ENCOUNTER — TELEPHONE (OUTPATIENT)
Dept: GASTROENTEROLOGY | Facility: CLINIC | Age: 48
End: 2023-08-24

## 2023-08-24 NOTE — TELEPHONE ENCOUNTER
----- Message from Anum Morgan RN sent at 8/24/2023 11:32 AM CDT -----  Regarding: Cancel procedure  Hi there,    Pre assessment was not completed for upcoming Colonoscopy  scheduled on 8/30/23.    Please cancel per policy.       Thank you,     Anum Morgan RN  Endoscopy Procedure Pre Assessment RN  [phone] option 4

## 2023-08-24 NOTE — TELEPHONE ENCOUNTER
No return call received.   Pre assessment was not completed for upcoming scheduled procedure.     Staff message sent to endoscopy scheduling to cancel procedure per policy.       Anum Morgan RN   Endoscopy Procedure Pre Assessment RN

## 2023-08-24 NOTE — TELEPHONE ENCOUNTER
Called back and rescheduled to     Rescheduled: Yes  Procedure: Lower Endoscopy [Colonoscopy]   Date: 10/3  Location: [facility]; [facility] [address] [place] [address]  Surgeon: Myra  Sedation Level Scheduled  cs,  Reason for Sedation Level ordered  Prep/Instructions updated and sent: y     Send In - basket message to Panc - Favian Pool if EUS  procedure is canceled or rescheduled: [ N/A, YES or NO] n/a

## 2023-08-24 NOTE — TELEPHONE ENCOUNTER
Reason for Reschedule/Cancellation (please be detailed, any staff messages or encounters to note?): pt did not return pre assessment phone call       Prior to reschedule please review:  Ordering Provider:     River Merlos MD in [facility]     Sedation per order: moderate  Does patient have any ASC Exclusions, please identify?: n      Notes on Cancelled Procedure:  Procedure: Lower Endoscopy [Colonoscopy]   Date: 08/30/2023  [facility]; [address] [place] [address], 5[address] Floor, [place] [address]  Surgeon: Ricky       Rescheduled: No

## 2023-10-03 ENCOUNTER — HOSPITAL ENCOUNTER (OUTPATIENT)
Facility: CLINIC | Age: 48
Discharge: HOME | End: 2023-10-03
Attending: COLON & RECTAL SURGERY | Admitting: COLON & RECTAL SURGERY
Payer: COMMERCIAL

## 2023-10-03 VITALS
HEIGHT: 73 IN | SYSTOLIC BLOOD PRESSURE: 120 MMHG | DIASTOLIC BLOOD PRESSURE: 76 MMHG | BODY MASS INDEX: 27.83 KG/M2 | WEIGHT: 210 LBS | HEART RATE: 62 BPM | OXYGEN SATURATION: 95 %

## 2023-10-03 LAB — COLONOSCOPY: NORMAL

## 2023-10-03 PROCEDURE — 45378 DIAGNOSTIC COLONOSCOPY: CPT | Performed by: COLON & RECTAL SURGERY

## 2023-10-03 PROCEDURE — 250N000011 HC RX IP 250 OP 636: Performed by: COLON & RECTAL SURGERY

## 2023-10-03 PROCEDURE — G0121 COLON CA SCRN NOT HI RSK IND: HCPCS | Performed by: COLON & RECTAL SURGERY

## 2023-10-03 PROCEDURE — 999N000099 HC STATISTIC MODERATE SEDATION < 10 MIN: Performed by: COLON & RECTAL SURGERY

## 2023-10-03 RX ORDER — LIDOCAINE 40 MG/G
CREAM TOPICAL
Status: DISCONTINUED | OUTPATIENT
Start: 2023-10-03 | End: 2023-10-03 | Stop reason: HOSPADM

## 2023-10-03 RX ORDER — ONDANSETRON 2 MG/ML
4 INJECTION INTRAMUSCULAR; INTRAVENOUS
Status: DISCONTINUED | OUTPATIENT
Start: 2023-10-03 | End: 2023-10-03 | Stop reason: HOSPADM

## 2023-10-03 RX ORDER — ONDANSETRON 2 MG/ML
4 INJECTION INTRAMUSCULAR; INTRAVENOUS EVERY 6 HOURS PRN
Status: DISCONTINUED | OUTPATIENT
Start: 2023-10-03 | End: 2023-10-03 | Stop reason: HOSPADM

## 2023-10-03 RX ORDER — FENTANYL CITRATE 50 UG/ML
INJECTION, SOLUTION INTRAMUSCULAR; INTRAVENOUS PRN
Status: DISCONTINUED | OUTPATIENT
Start: 2023-10-03 | End: 2023-10-03 | Stop reason: HOSPADM

## 2023-10-03 RX ORDER — NALOXONE HYDROCHLORIDE 0.4 MG/ML
0.4 INJECTION, SOLUTION INTRAMUSCULAR; INTRAVENOUS; SUBCUTANEOUS
Status: DISCONTINUED | OUTPATIENT
Start: 2023-10-03 | End: 2023-10-03 | Stop reason: HOSPADM

## 2023-10-03 RX ORDER — FLUMAZENIL 0.1 MG/ML
0.2 INJECTION, SOLUTION INTRAVENOUS
Status: DISCONTINUED | OUTPATIENT
Start: 2023-10-03 | End: 2023-10-03 | Stop reason: HOSPADM

## 2023-10-03 RX ORDER — PROCHLORPERAZINE MALEATE 10 MG
10 TABLET ORAL EVERY 6 HOURS PRN
Status: DISCONTINUED | OUTPATIENT
Start: 2023-10-03 | End: 2023-10-03 | Stop reason: HOSPADM

## 2023-10-03 RX ORDER — ONDANSETRON 4 MG/1
4 TABLET, ORALLY DISINTEGRATING ORAL EVERY 6 HOURS PRN
Status: DISCONTINUED | OUTPATIENT
Start: 2023-10-03 | End: 2023-10-03 | Stop reason: HOSPADM

## 2023-10-03 RX ORDER — NALOXONE HYDROCHLORIDE 0.4 MG/ML
0.2 INJECTION, SOLUTION INTRAMUSCULAR; INTRAVENOUS; SUBCUTANEOUS
Status: DISCONTINUED | OUTPATIENT
Start: 2023-10-03 | End: 2023-10-03 | Stop reason: HOSPADM

## 2023-10-03 ASSESSMENT — ACTIVITIES OF DAILY LIVING (ADL): ADLS_ACUITY_SCORE: 35

## 2023-10-03 NOTE — H&P
Pre-Endoscopy History and Physical   Susannah Rowe MRN# [id number]   YOB: 1975 Age: 48 year old   Date of Procedure: 10/3/2023   Primary care provider: River Merlos   Type of Endoscopy: colonoscopy   Reason for Procedure: screening   Type of Anesthesia Anticipated: Moderate Sedation   HPI:   Susannah is a 48 year old male for screening colonoscopy.  He has never had a colonoscopy before.  He denies BRBPR, abdominal pain, nausea/vomiting, changes in bowel habits or unintentional weight loss.  He denies a FH of CRC.    No Known Allergies   Prior to Admission Medications   Prescriptions Last Dose Informant Patient Reported? Taking?   meloxicam (MOBIC) 7.5 MG tablet Past Week  No Yes   Sig: Take 2 tablets (15 mg) by mouth daily Use 15 mg x3 days, then start 7.5mg daily   sildenafil (VIAGRA) 25 MG tablet More than a month  No Yes   Sig: Take 1 tablet (25 mg) by mouth daily as needed (ED)      Facility-Administered Medications: None      Patient Active Problem List   Diagnosis    CARDIOVASCULAR SCREENING; LDL GOAL LESS THAN 160    Right shoulder pain    Rhomboid muscle strain, initial encounter    Androgenic alopecia, unspecified    Colon cancer screening    Impaired fasting glucose    Osteoarthritis of spine with radiculopathy, lumbar region      History reviewed. No pertinent past medical history.   History reviewed. No pertinent surgical history.   Social History     Tobacco Use    Smoking status: Never    Smokeless tobacco: Never   Substance Use Topics    Alcohol use: Yes     Comment: infrequently      Family History   Problem Relation Age of Onset    Pneumonia Mother         recurrent, severe    Hyperlipidemia Father     Breast Cancer Maternal Grandmother     Myocardial Infarction Maternal Grandfather 50    Diabetes Paternal Grandmother         live to age 80    Prostate Cancer No family hx of     Rheumatoid Arthritis No family hx of     Ankylosing Spondylitis No family hx of       PHYSICAL  "EXAM:   /77   Pulse 68   Resp 16   Ht 1.854 m (6' 1\")   Wt 95.3 kg (210 lb)   SpO2 98%   BMI 27.71 kg/m   Estimated body mass index is 27.71 kg/m  as calculated from the following:    Height as of this encounter: 1.854 m (6' 1\").    Weight as of this encounter: 95.3 kg (210 lb).   RESP: lungs clear to auscultation - no rales, rhonchi or wheezes   CV: regular rates and rhythm   ASA Class 2 - Mild systemic disease    Assessment: 47 y/o gentleman for screening colonoscopy    Plan: Colonoscopy with moderate sedation.  Risks of the procedure were discussed including, but not limited to, bleeding, perforation and missed lesions.  Patient understands and is willing to proceed.    Esteban Renteria MD ....................  10/3/2023   11:13 AM  Colon and Rectal Surgery Staff  [phone]     "

## 2023-11-14 DIAGNOSIS — N52.9 ERECTILE DYSFUNCTION, UNSPECIFIED ERECTILE DYSFUNCTION TYPE: ICD-10-CM

## 2023-11-17 RX ORDER — SILDENAFIL 25 MG/1
25 TABLET, FILM COATED ORAL DAILY PRN
Qty: 30 TABLET | Refills: 3 | Status: SHIPPED | OUTPATIENT
Start: 2023-11-17 | End: 2024-04-28

## 2023-11-17 NOTE — TELEPHONE ENCOUNTER
sildenafil (VIAGRA) 25 MG tablet 30 tablet 3 6/26/2023  Last Office Visit : 4/17/2023   Future Office visit:  0

## 2024-01-14 DIAGNOSIS — M54.50 CHRONIC RIGHT-SIDED LOW BACK PAIN WITHOUT SCIATICA: ICD-10-CM

## 2024-01-14 DIAGNOSIS — G89.29 CHRONIC RIGHT-SIDED LOW BACK PAIN WITHOUT SCIATICA: ICD-10-CM

## 2024-01-16 NOTE — TELEPHONE ENCOUNTER
Meloxicam Oral Tablet 7.5 MG   Last Written Prescription Date:  12/2/2022  Last Fill Quantity: 60,   # refills: 1  Last Office Visit : 4/17/2023  Future Office visit:  None    Routing refill request to provider for review/approval because:  Needing updated AST, CBC, ALT  Refer to clinic for review and refills per Providers orders.                 NSAID Medications [id number]/14/2024 02:01 AM   Protocol Details Normal ALT on file in past 12 months    Normal AST on file in past 12 months    Normal CBC on file in past 12 months    Always Fail Criteria - Chart Review Required    Normal GFR on file in past 12 months           Eleanor Valle RN  Central Triage Red Flags/Med Refills

## 2024-01-17 RX ORDER — MELOXICAM 7.5 MG/1
TABLET ORAL
Qty: 60 TABLET | Refills: 0 | Status: SHIPPED | OUTPATIENT
Start: 2024-01-17 | End: 2024-07-22

## 2024-04-28 DIAGNOSIS — N52.9 ERECTILE DYSFUNCTION, UNSPECIFIED ERECTILE DYSFUNCTION TYPE: Primary | ICD-10-CM

## 2024-05-01 RX ORDER — SILDENAFIL 25 MG/1
25 TABLET, FILM COATED ORAL DAILY PRN
Qty: 30 TABLET | Refills: 0 | Status: SHIPPED | OUTPATIENT
Start: 2024-05-01 | End: 2024-05-30

## 2024-05-30 DIAGNOSIS — N52.9 ERECTILE DYSFUNCTION, UNSPECIFIED ERECTILE DYSFUNCTION TYPE: ICD-10-CM

## 2024-06-04 RX ORDER — SILDENAFIL 25 MG/1
25 TABLET, FILM COATED ORAL DAILY PRN
Qty: 30 TABLET | Refills: 0 | Status: SHIPPED | OUTPATIENT
Start: 2024-06-04 | End: 2024-07-10

## 2024-06-04 NOTE — TELEPHONE ENCOUNTER
Patient confirmed scheduled appointment:  Date: 7/22  Time: 4:30p  Visit type: EPP  Provider: PCP

## 2024-06-04 NOTE — TELEPHONE ENCOUNTER
sildenafil (VIAGRA) 25 MG tablet 30 tablet 0 5/1/2024 -- No   Sig - Route: Take 1 tablet (25 mg) by mouth daily as needed      ----------------------  Last Office Visit : 4/17/23 Gaurang  Future Office visit:  0  ----------------------      Routing refill request to provider for review/approval because:  Refill request via fax  Patient overdue for annual follow-up appointment, carol refills already given.        Pass/Fail Protocol Criteria:  Erectile Dysfuction Protocol [id number]/04/2024 01:38 PM   Protocol Details Recent (12 mo) or future (30 days) visit within the authorizing provider's specialty    Absence of nitrates on medication list    Absence of Alpha Blockers on Med list    Medication is active on med list    Patient is age 18 or older

## 2024-07-10 DIAGNOSIS — N52.9 ERECTILE DYSFUNCTION, UNSPECIFIED ERECTILE DYSFUNCTION TYPE: ICD-10-CM

## 2024-07-10 RX ORDER — SILDENAFIL 25 MG/1
25 TABLET, FILM COATED ORAL DAILY PRN
Qty: 30 TABLET | Refills: 0 | Status: SHIPPED | OUTPATIENT
Start: 2024-07-10 | End: 2024-08-12

## 2024-07-10 NOTE — TELEPHONE ENCOUNTER
sildenafil (VIAGRA) 25 MG tablet 30 tablet 0 6/4/2024        Last Office Visit : 6/4/24  Future Office visit:  30    Routing refill request to provider for review/approval because:  Was given carol refill and now scheduled on 7/22/24     Last script states..Follow-up appt need before any further refills can be given

## 2024-07-10 NOTE — TELEPHONE ENCOUNTER
[facility] Call Center    Phone Message    May a detailed message be left on voicemail: yes     Reason for Call: Medication Refill Request    Has the patient contacted the pharmacy for the refill? Yes   Name of medication being requested: sildenafil (VIAGRA) 25 MG tablet   Provider who prescribed the medication:   Pharmacy:   [id number] [address]     Date medication is needed: asap       Action Taken: Message routed to:  [facility]    Travel Screening: Not Applicable     Date of Service:

## 2024-07-22 ENCOUNTER — OFFICE VISIT (OUTPATIENT)
Dept: INTERNAL MEDICINE | Facility: CLINIC | Age: 49
End: 2024-07-22
Payer: COMMERCIAL

## 2024-07-22 VITALS
HEART RATE: 109 BPM | HEIGHT: 72 IN | BODY MASS INDEX: 31.69 KG/M2 | OXYGEN SATURATION: 97 % | WEIGHT: 234 LBS | SYSTOLIC BLOOD PRESSURE: 133 MMHG | DIASTOLIC BLOOD PRESSURE: 81 MMHG

## 2024-07-22 DIAGNOSIS — Z00.00 PREVENTATIVE HEALTH CARE: ICD-10-CM

## 2024-07-22 DIAGNOSIS — Z11.4 SCREENING FOR HIV (HUMAN IMMUNODEFICIENCY VIRUS): Primary | ICD-10-CM

## 2024-07-22 DIAGNOSIS — M47.26 OSTEOARTHRITIS OF SPINE WITH RADICULOPATHY, LUMBAR REGION: ICD-10-CM

## 2024-07-22 DIAGNOSIS — R03.0 ELEVATED BLOOD PRESSURE READING WITHOUT DIAGNOSIS OF HYPERTENSION: ICD-10-CM

## 2024-07-22 DIAGNOSIS — N52.9 ERECTILE DYSFUNCTION, UNSPECIFIED ERECTILE DYSFUNCTION TYPE: ICD-10-CM

## 2024-07-22 DIAGNOSIS — Z11.59 NEED FOR HEPATITIS C SCREENING TEST: ICD-10-CM

## 2024-07-22 PROBLEM — G89.29 CHRONIC RIGHT-SIDED LOW BACK PAIN WITHOUT SCIATICA: Status: ACTIVE | Noted: 2024-07-22

## 2024-07-22 PROBLEM — M54.50 CHRONIC RIGHT-SIDED LOW BACK PAIN WITHOUT SCIATICA: Status: ACTIVE | Noted: 2024-07-22

## 2024-07-22 PROCEDURE — 99396 PREV VISIT EST AGE 40-64: CPT | Performed by: PEDIATRICS

## 2024-07-22 RX ORDER — MELOXICAM 7.5 MG/1
7.5-15 TABLET ORAL DAILY PRN
Qty: 60 TABLET | Refills: 0 | Status: SHIPPED | OUTPATIENT
Start: 2024-07-22 | End: 2024-09-14

## 2024-07-22 RX ORDER — SILDENAFIL 50 MG/1
25-50 TABLET, FILM COATED ORAL DAILY PRN
Qty: 30 TABLET | Refills: 11 | Status: SHIPPED | OUTPATIENT
Start: 2024-07-22

## 2024-07-22 SDOH — HEALTH STABILITY: PHYSICAL HEALTH: ON AVERAGE, HOW MANY MINUTES DO YOU ENGAGE IN EXERCISE AT THIS LEVEL?: 50 MIN

## 2024-07-22 SDOH — HEALTH STABILITY: PHYSICAL HEALTH: ON AVERAGE, HOW MANY DAYS PER WEEK DO YOU ENGAGE IN MODERATE TO STRENUOUS EXERCISE (LIKE A BRISK WALK)?: 3 DAYS

## 2024-07-22 ASSESSMENT — SOCIAL DETERMINANTS OF HEALTH (SDOH): HOW OFTEN DO YOU GET TOGETHER WITH FRIENDS OR RELATIVES?: THREE TIMES A WEEK

## 2024-07-22 NOTE — PROGRESS NOTES
"Preventive Care Visit  [facility]  River Merlos MD, Internal Medicine - Pediatrics  Jul 22, 2024      Assessment & Plan     Preventive-oriented section:    No other/new concerns identified on exam  Reviewed preventive health counseling  Special attention given to:       -- Physical activity       -- Sitting/floor capacity       -- Dietary needs including fruits, vegetables, protein sources, calcium sources, fluid       -- Calcium supplements and weight-bearing exercise       -- Regular dental visits  Immunization: discussed, will send message after labs  Screening for:       -- Elevated lipids and cardiovascular risk checking today       -- Diabetes checking today       -- Hepatitis C checking today       -- HIV checking today       -- Colon cancer screening up to date    Counseling  Appropriate preventive services were addressed with this patient via screening, questionnaire, or discussion as appropriate for fall prevention, nutrition, physical activity, Tobacco-use cessation, weight loss and cognition.  Checklist reviewing preventive services available has been given to the patient.  Reviewed patient's diet, addressing concerns and/or questions.   He is at risk for lack of exercise and has been provided with information to increase physical activity for the benefit of his well-being.     Estimated body mass index is 31.52 kg/m  as calculated from the following:    Height as of this encounter: 1.835 m (6' 0.24\").    Weight as of this encounter: 106.1 kg (234 lb).      Problem-oriented Assessment & Plan  ------------------------------------------------------------------  Straightforward issues addressed:    Problem List as of 7/22/2024 Reviewed: 7/22/2024  7:54 PM by River Merlos MD            Noted    1. Osteoarthritis of spine with radiculopathy, lumbar region 1/5/2023     Overview Signed 4/20/2023 11:09 AM by River Merlos MD      PT  MRI " (4/18/23): degenerative disc disease and L3-L4 left central disc protrusion abuts and displaces the exiting left L3 nerve root  XR (4/17/23): Multilevel degenerative changes most pronounced at L4-L5  (Also has Mild enthesopathy of Iliac innominate bones)          Last Assessment & Plan 7/22/2024 Office Visit Edited 7/22/2024  7:50 PM by River Merlos MD      Req refill of meloxicam, which he uses occasionally. No radiation now.       OBJECTIVE: Pain demonstrated in one or the other paralumbar areas, well below hinge/posture point       ASSESSMENT and PLAN: Discussed about appropriate use, taking with water and at least a little bit of food.          Relevant Medications     meloxicam (MOBIC) 7.5 MG tablet    2. Elevated blood pressure reading without diagnosis of hypertension 7/22/2024     Last Assessment & Plan 7/22/2024 Office Visit Written 7/22/2024  7:52 PM by River Merlos MD      Slight elevation today, but not in range for treatment.       OBJECTIVE: no S4       ASSESSMENT and PLAN: Discussed about need for ongoing screening/measurement. I would guess he will need treatment but not until BP sustainably up.          Relevant Orders     Basic metabolic panel  (Ca, Cl, CO2, Creat, Gluc, K, Na, BUN)    3. Erectile dysfunction, unspecified erectile dysfunction type 7/22/2024     Last Assessment & Plan 7/22/2024 Office Visit Written 7/22/2024  7:55 PM by River Merlos MD      Refilled sildenafil. Discussed about insurance variation in allowance. He seems to have obtained bottles of 30.          Relevant Medications     sildenafil (VIAGRA) 50 MG tablet         Luis Manuel Davalos is a 49 year old, presenting for the following:  Physical        7/22/2024     4:21 PM   Additional Questions   Roomed by NEREYDA, EMT        Via the Health Maintenance questionnaire, the patient has reported the following services have been completed -Colonscopy: [facility] 2023-10-10, this information has been sent to  the abstraction team.      HPI          7/22/2024   General Health   How would you rate your overall physical health? Good   Feel stress (tense, anxious, or unable to sleep) Not at all            7/22/2024   Nutrition   Three or more servings of calcium each day? Yes   Diet: Regular (no restrictions)   How many servings of fruit and vegetables per day? (!) 2-3   How many sweetened beverages each day? 0-1            7/22/2024   Exercise   Days per week of moderate/strenous exercise 3 days   Average minutes spent exercising at this level 50 min            7/22/2024   Social Factors   Frequency of gathering with friends or relatives Three times a week   Worry food won't last until get money to buy more No   Food not last or not have enough money for food? No   Do you have housing? (Housing is defined as stable permanent housing and does not include staying ouside in a car, in a tent, in an abandoned building, in an overnight shelter, or couch-surfing.) Yes   Are you worried about losing your housing? No   Lack of transportation? No   Unable to get utilities (heat,electricity)? No            7/22/2024   Dental   Dentist two times every year? Yes            7/22/2024   TB Screening   Were you born outside of the [place]? No            Today's PHQ-2 Score:       7/22/2024     4:31 PM   PHQ-2 ( 1999 [employer])   Q1: Little interest or pleasure in doing things 0   Q2: Feeling down, depressed or hopeless 0   PHQ-2 Score 0   Q1: Little interest or pleasure in doing things Not at all   Q2: Feeling down, depressed or hopeless Not at all   PHQ-2 Score 0           7/22/2024   Substance Use   Alcohol more than 3/day or more than 7/wk No   Do you use any other substances recreationally? No        Social History     Tobacco Use    Smoking status: Never    Smokeless tobacco: Never   Substance Use Topics    Alcohol use: Yes     Comment: infrequently    Drug use: No             7/22/2024   One time HIV Screening   Previous HIV test? Yes     "      7/22/2024   STI Screening   New sexual partner(s) since last STI/HIV test? No      ASCVD Risk   The 10-year ASCVD risk score (Brock ZULETA, et al., 2019) is: 2.8%    Values used to calculate the score:      Age: 49 years      Sex: Male      Is Non- : No      Diabetic: No      Tobacco smoker: No      Systolic Blood Pressure: 133 mmHg      Is BP treated: No      HDL Cholesterol: 48 mg/dL      Total Cholesterol: 170 mg/dL        7/22/2024   Contraception/Family Planning   Questions about contraception or family planning No           Reviewed and updated as needed this visit by Provider   Tobacco  Allergies  Meds  Problems  Med Hx  Surg Hx  Fam Hx                     Objective    Exam  /81 (BP Location: Right arm, Patient Position: Sitting, Cuff Size: Adult Large)   Pulse 109   Ht 1.835 m (6' 0.24\")   Wt 106.1 kg (234 lb)   SpO2 97%   BMI 31.52 kg/m     Estimated body mass index is 31.52 kg/m  as calculated from the following:    Height as of this encounter: 1.835 m (6' 0.24\").    Weight as of this encounter: 106.1 kg (234 lb).    Physical Exam  Constitutional:       Appearance: Normal appearance. He is normal weight. He is not ill-appearing.   HENT:      Head: Normocephalic and atraumatic.      Right Ear: Tympanic membrane, ear canal and external ear normal.      Left Ear: Tympanic membrane, ear canal and external ear normal.      Nose: Nose normal. No rhinorrhea.      Mouth/Throat:      Mouth: Mucous membranes are moist.      Pharynx: No posterior oropharyngeal erythema.   Eyes:      General:         Right eye: No discharge.         Left eye: No discharge.      Extraocular Movements: Extraocular movements intact.      Conjunctiva/sclera: Conjunctivae normal.      Pupils: Pupils are equal, round, and reactive to light.   Neck:      Thyroid: No thyroid mass or thyromegaly.   Cardiovascular:      Rate and Rhythm: Normal rate and regular rhythm.      Heart sounds: No " murmur heard.     No gallop.   Pulmonary:      Effort: Pulmonary effort is normal. No respiratory distress.      Breath sounds: Normal breath sounds. No rales.   Abdominal:      General: Abdomen is flat. Bowel sounds are normal.      Palpations: There is no mass.      Tenderness: There is no abdominal tenderness.   Musculoskeletal:         General: No tenderness. Normal range of motion.      Cervical back: Normal range of motion and neck supple.      Right lower leg: No edema.      Left lower leg: No edema.   Lymphadenopathy:      Cervical: No cervical adenopathy.   Skin:     General: Skin is warm.      Capillary Refill: Capillary refill takes less than 2 seconds.      Findings: No lesion or rash.   Neurological:      General: No focal deficit present.      Mental Status: He is alert.      Motor: Motor function is intact.      Coordination: Romberg sign negative. Coordination normal. Finger-Nose-Finger Test normal.      Gait: Gait is intact.      Deep Tendon Reflexes: Reflexes normal.      Reflex Scores:       Patellar reflexes are 2+ on the right side and 2+ on the left side.       Achilles reflexes are 2+ on the right side and 2+ on the left side.  Psychiatric:         Mood and Affect: Mood normal.         Behavior: Behavior normal.         Thought Content: Thought content normal.               Signed Electronically by: River eMrlos MD

## 2024-07-23 NOTE — ASSESSMENT & PLAN NOTE
Slight elevation today, but not in range for treatment.       OBJECTIVE: no S4       ASSESSMENT and PLAN: Discussed about need for ongoing screening/measurement. I would guess he will need treatment but not until BP sustainably up.

## 2024-07-23 NOTE — ASSESSMENT & PLAN NOTE
Refilled sildenafil. Discussed about insurance variation in allowance. He seems to have obtained bottles of 30.

## 2024-07-23 NOTE — ASSESSMENT & PLAN NOTE
Req refill of meloxicam, which he uses occasionally. No radiation now.       OBJECTIVE: Pain demonstrated in one or the other paralumbar areas, well below hinge/posture point       ASSESSMENT and PLAN: Discussed about appropriate use, taking with water and at least a little bit of food.

## 2024-08-12 DIAGNOSIS — N52.9 ERECTILE DYSFUNCTION, UNSPECIFIED ERECTILE DYSFUNCTION TYPE: ICD-10-CM

## 2024-08-12 RX ORDER — SILDENAFIL 25 MG/1
25 TABLET, FILM COATED ORAL DAILY PRN
Qty: 30 TABLET | Refills: 10 | Status: SHIPPED | OUTPATIENT
Start: 2024-08-12

## 2024-08-12 NOTE — TELEPHONE ENCOUNTER
sildenafil (VIAGRA) 25 MG tablet       Last Written Prescription Date:  7/10/24  Last Fill Quantity: 30,   # refills: 0  Last Office Visit : 7/22/24  Future Office visit:  None    Refilled per protocol    Dayna SKINNER RN  [facility] Flag Triage & Med Refill Team

## 2024-09-14 DIAGNOSIS — M47.26 OSTEOARTHRITIS OF SPINE WITH RADICULOPATHY, LUMBAR REGION: ICD-10-CM

## 2024-09-19 NOTE — TELEPHONE ENCOUNTER
meloxicam (MOBIC) 7.5 MG tablet        Last Written Prescription Date:  7/22/24  Last Fill Quantity: 60,   # refills: 0  Last Office Visit : 7/22/24 blaze  Future Office visit:  None    Routing refill request to provider for review/approval because:  Labs due per protocol CBC and GFR( last 4/7/23)  CBC RESULTS:   Recent Labs   Lab Test 04/07/23  1651   WBC 9.1   RBC 6.18*   HGB 16.6   HCT 50.8   MCV 82   MCH 26.9   MCHC 32.7   RDW 13.9        GFR Estimate   Date Value Ref Range Status   04/07/2023 >90 >60 mL/min/1.73m2 Final     Comment:     eGFR calculated using 2021 CKD-EPI equation.

## 2024-09-22 RX ORDER — MELOXICAM 7.5 MG/1
7.5-15 TABLET ORAL DAILY PRN
Qty: 60 TABLET | Refills: 0 | Status: SHIPPED | OUTPATIENT
Start: 2024-09-22

## 2024-10-28 DIAGNOSIS — M47.26 OSTEOARTHRITIS OF SPINE WITH RADICULOPATHY, LUMBAR REGION: ICD-10-CM

## 2024-11-01 RX ORDER — MELOXICAM 7.5 MG/1
7.5-15 TABLET ORAL DAILY PRN
Qty: 60 TABLET | Refills: 0 | Status: SHIPPED | OUTPATIENT
Start: 2024-11-01

## 2025-01-02 DIAGNOSIS — M47.26 OSTEOARTHRITIS OF SPINE WITH RADICULOPATHY, LUMBAR REGION: ICD-10-CM

## 2025-01-04 NOTE — TELEPHONE ENCOUNTER
meloxicam (MOBIC) 7.5 MG tablet 60 tablet 0 11/1/2024     Last Office Visit : 7/22/24  Future Office visit:  0    Routing refill request to provider for review/approval because:  Overdue for labs   already given carol refill

## 2025-01-06 RX ORDER — MELOXICAM 7.5 MG/1
7.5-15 TABLET ORAL DAILY PRN
Qty: 60 TABLET | Refills: 0 | Status: SHIPPED | OUTPATIENT
Start: 2025-01-06

## 2025-01-28 ENCOUNTER — TELEPHONE (OUTPATIENT)
Dept: NURSING | Facility: CLINIC | Age: 50
End: 2025-01-28
Payer: COMMERCIAL

## 2025-01-28 NOTE — TELEPHONE ENCOUNTER
Having hemorrhoid flare up. Should he be seen for it? Can a prescription be called in? Declines triage. Said he will call back tomorrow instead. He declined my sending an encounter to the clinic.  Esther Lazar RN  [facility]

## 2025-03-14 DIAGNOSIS — M47.26 OSTEOARTHRITIS OF SPINE WITH RADICULOPATHY, LUMBAR REGION: ICD-10-CM

## 2025-03-19 RX ORDER — MELOXICAM 7.5 MG/1
7.5-15 TABLET ORAL DAILY PRN
Qty: 60 TABLET | Refills: 0 | OUTPATIENT
Start: 2025-03-19

## 2025-03-19 NOTE — TELEPHONE ENCOUNTER
Refill request for meloxicam (MOBIC) 7.5 MG tablet refused, called to pharmacy, per MD per last refill, patient needs blood work and appointment for additional refills

## (undated) DEVICE — DECANTER BAG 2002S

## (undated) DEVICE — SOL WATER IRRIG 1000ML BOTTLE 2F7114

## (undated) RX ORDER — FENTANYL CITRATE 50 UG/ML
INJECTION, SOLUTION INTRAMUSCULAR; INTRAVENOUS
Status: DISPENSED
Start: 2023-10-03